# Patient Record
Sex: MALE | Race: WHITE | NOT HISPANIC OR LATINO | URBAN - METROPOLITAN AREA
[De-identification: names, ages, dates, MRNs, and addresses within clinical notes are randomized per-mention and may not be internally consistent; named-entity substitution may affect disease eponyms.]

---

## 2018-07-06 ENCOUNTER — OFFICE VISIT (OUTPATIENT)
Dept: INTERNAL MEDICINE | Facility: CLINIC | Age: 28
End: 2018-07-06
Payer: COMMERCIAL

## 2018-07-06 VITALS
TEMPERATURE: 97.8 F | RESPIRATION RATE: 18 BRPM | DIASTOLIC BLOOD PRESSURE: 78 MMHG | SYSTOLIC BLOOD PRESSURE: 110 MMHG | HEART RATE: 79 BPM | BODY MASS INDEX: 25.49 KG/M2 | OXYGEN SATURATION: 98 % | WEIGHT: 168.2 LBS | HEIGHT: 68 IN

## 2018-07-06 DIAGNOSIS — Z00.00 ENCOUNTER FOR ANNUAL PHYSICAL EXAM: Primary | ICD-10-CM

## 2018-07-06 PROCEDURE — 99385 PREV VISIT NEW AGE 18-39: CPT | Performed by: INTERNAL MEDICINE

## 2018-07-06 ASSESSMENT — ENCOUNTER SYMPTOMS
SHORTNESS OF BREATH: 0
DIARRHEA: 0
NERVOUS/ANXIOUS: 0
DIFFICULTY URINATING: 0
SORE THROAT: 0
PALPITATIONS: 0
SEIZURES: 0
ABDOMINAL PAIN: 0
FREQUENCY: 0
WEAKNESS: 0
CONSTIPATION: 0
BACK PAIN: 1
COUGH: 0
DIZZINESS: 0
SINUS PRESSURE: 0
BRUISES/BLEEDS EASILY: 0
DYSPHORIC MOOD: 0
SLEEP DISTURBANCE: 1
NECK PAIN: 0
ARTHRALGIAS: 0
JOINT SWELLING: 0
NUMBNESS: 0
UNEXPECTED WEIGHT CHANGE: 0
FATIGUE: 0
FEVER: 0
HEADACHES: 0

## 2018-07-06 NOTE — ASSESSMENT & PLAN NOTE
Doing well.  Advised to get the flu shot in the fall.  Labs not indicated.  Declines anyway.  Advised to check records for Tdap status.

## 2018-07-06 NOTE — PROGRESS NOTES
Subjective      Patient ID: Al Luu is a 27 y.o. male.    New patient.  Presents for a physical. Feels well.  No complaints.        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 No weight    Diet- regular    Pets- Cat    Islam- Sikhism    Seat belt- yes    Smoke alarm- yes    Firearms- None        Grew up in [place] [place].  School in [employer] [place] [facility] [facility], worked in [place] for a couple years and moved back here 4 yrs ago.               Review of Systems   Constitutional: Negative for fatigue, fever and unexpected weight change.   HENT: Negative for congestion, hearing loss, sinus pressure and sore throat.    Eyes: Negative for visual disturbance.   Respiratory: Negative for cough and shortness of breath.    Cardiovascular: Negative for chest pain, palpitations and leg swelling.   Gastrointestinal: Negative for abdominal pain, constipation and diarrhea.   Endocrine: Negative for cold intolerance and heat intolerance.   Genitourinary: Negative for difficulty  "urinating and frequency.   Musculoskeletal: Positive for back pain. Negative for arthralgias, joint swelling and neck pain.   Skin: Negative for rash.   Allergic/Immunologic: Negative for environmental allergies and food allergies.   Neurological: Negative for dizziness, seizures, weakness, numbness and headaches.   Hematological: Does not bruise/bleed easily.   Psychiatric/Behavioral: Positive for sleep disturbance (can't fall asleep). Negative for dysphoric mood. The patient is not nervous/anxious.        No Known Allergies  No current outpatient prescriptions on file.     No current facility-administered medications for this visit.        Objective   Vitals:    07/06/18 1045   BP: 110/78   BP Location: Left upper arm   Patient Position: Sitting   Pulse: 79   Resp: 18   Temp: 36.6 °C (97.8 °F)   SpO2: 98%   Weight: 76.3 kg (168 lb 3.2 oz)   Height: 1.715 m (5' 7.5\")       Physical Exam   Constitutional: He is oriented to person, place, and time. He appears well-developed and well-nourished. He is cooperative.   HENT:   Head: Normocephalic and atraumatic.   Right Ear: Tympanic membrane, external ear and ear canal normal.   Left Ear: Tympanic membrane, external ear and ear canal normal.   Nose: Nose normal.   Mouth/Throat: Uvula is midline, oropharynx is clear and moist and mucous membranes are normal.   Eyes: Conjunctivae, EOM and lids are normal. Pupils are equal, round, and reactive to light.   Neck: Trachea normal, normal range of motion and phonation normal. Neck supple. Carotid bruit is not present. No thyroid mass and no thyromegaly present.   Cardiovascular: Normal rate, regular rhythm, S1 normal, S2 normal, normal heart sounds and intact distal pulses.    Pulmonary/Chest: Effort normal and breath sounds normal. He has no decreased breath sounds. He has no wheezes. He has no rhonchi. He has no rales.   Abdominal: Soft. Normal appearance and bowel sounds are normal. There is no tenderness. There is no rebound " and no guarding.   Musculoskeletal: Normal range of motion.   Neurological: He is alert and oriented to person, place, and time. He has normal strength. No cranial nerve deficit or sensory deficit. Gait normal.   Skin: Skin is warm, dry and intact. No rash noted.   Psychiatric: He has a normal mood and affect. His speech is normal and behavior is normal. Judgment and thought content normal. Cognition and memory are normal.       Assessment/Plan   Problem List Items Addressed This Visit     Encounter for annual physical exam - Primary     Doing well.  Advised to get the flu shot in the fall.  Labs not indicated.  Declines anyway.  Advised to check records for Tdap status.                Jess Edgar MD    7/6/2018

## 2019-11-18 ENCOUNTER — TELEPHONE (OUTPATIENT)
Dept: INTERNAL MEDICINE | Facility: CLINIC | Age: 29
End: 2019-11-18

## 2019-11-18 NOTE — TELEPHONE ENCOUNTER
Pts wife Julia called to make an appointment for her  who reports the following symptoms: vomiting approximately one hour after he eats but this does not happen every day only notice this a few times in past week also has some back pain and feels nauseas. Offered to get him scheduled with the NP but the times offered conflict with his schedule. 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 Wife asked me to send a message to his doctor.

## 2019-11-25 ENCOUNTER — OFFICE VISIT (OUTPATIENT)
Dept: INTERNAL MEDICINE | Facility: CLINIC | Age: 29
End: 2019-11-25
Payer: COMMERCIAL

## 2019-11-25 VITALS
HEIGHT: 67 IN | DIASTOLIC BLOOD PRESSURE: 82 MMHG | TEMPERATURE: 97.3 F | SYSTOLIC BLOOD PRESSURE: 122 MMHG | OXYGEN SATURATION: 99 % | BODY MASS INDEX: 25.43 KG/M2 | WEIGHT: 162 LBS | RESPIRATION RATE: 14 BRPM | HEART RATE: 78 BPM

## 2019-11-25 DIAGNOSIS — R11.0 NAUSEA: ICD-10-CM

## 2019-11-25 DIAGNOSIS — R10.84 GENERALIZED ABDOMINAL PAIN: Primary | ICD-10-CM

## 2019-11-25 PROCEDURE — 99213 OFFICE O/P EST LOW 20 MIN: CPT | Performed by: NURSE PRACTITIONER

## 2019-11-25 ASSESSMENT — ENCOUNTER SYMPTOMS
HEMATURIA: 0
CHEST TIGHTNESS: 0
HEMATOCHEZIA: 0
ADENOPATHY: 0
UNEXPECTED WEIGHT CHANGE: 0
BELCHING: 0
MYALGIAS: 0
ABDOMINAL PAIN: 1
LIGHT-HEADEDNESS: 0
PALPITATIONS: 0
COUGH: 0
WEIGHT LOSS: 0
FEVER: 0
ANOREXIA: 0
EYE REDNESS: 0
FREQUENCY: 0
VOMITING: 0
SORE THROAT: 0
ANAL BLEEDING: 0
DIZZINESS: 0
FATIGUE: 0
DIARRHEA: 1
ARTHRALGIAS: 0
NAUSEA: 1
DYSURIA: 0
HEADACHES: 0
CHILLS: 0
ABDOMINAL DISTENTION: 0
RECTAL PAIN: 0
BLOOD IN STOOL: 0
CONSTIPATION: 0
FLATUS: 0
WHEEZING: 0

## 2019-11-25 NOTE — PROGRESS NOTES
Subjective      Patient ID: Al Luu is a 28 y.o. male.  1990      28 year old male with no significant past medical history  presents to the office with complaint of generalized abdominal discomfort along with nausea.  He said these symptoms have been occurring for a couple of months but were worse last week.  As of this week, he is feeling better.  He says the discomfort is generalized.  It does not localize at all.  He does feel nauseous but does not vomit.  He says pepto bismal and TUMS help his symptoms.  He denies fever, chills.  He denies associated foods, drinks or medications.  He will have diarrhea maybe twice a week but not all the time.  He denies black or bloody stools.  What made him come today was that he was having lower back pain with the abdominal pain last week and was concerned for an ulcer.  However, his symptoms have resolved and he is feeling better. He also reports he was sleeping on the cough while feeling ill which he thinks caused his back pain.  Denies presently.   Denies increased use of NSAIDS, denies recent travel, denies bad food, denies new medications.    Abdominal Pain   The current episode started more than 1 month ago. The problem occurs intermittently. The problem has been gradually improving. The pain is located in the generalized abdominal region. The abdominal pain radiates to the back. Associated symptoms include diarrhea and nausea. Pertinent negatives include no anorexia, arthralgias, belching, constipation, dysuria, fever, flatus, frequency, headaches, hematochezia, hematuria, melena, myalgias, vomiting or weight loss. Relieved by: pepto and  tums.       The following have been reviewed and updated as appropriate in this visit:  Tobacco  Fam Hx       Review of Systems   Constitutional: Negative for chills, fatigue, fever, unexpected weight change and weight loss.   HENT: Negative for sore throat.    Eyes: Negative for redness.   Respiratory: Negative for  "cough, chest tightness and wheezing.    Cardiovascular: Negative for chest pain and palpitations.   Gastrointestinal: Positive for abdominal pain, diarrhea and nausea. Negative for abdominal distention, anal bleeding, anorexia, blood in stool, constipation, flatus, hematochezia, melena, rectal pain and vomiting.   Genitourinary: Negative for dysuria, frequency, hematuria and urgency.   Musculoskeletal: Negative for arthralgias and myalgias.   Skin: Negative for rash.   Allergic/Immunologic: Negative for food allergies.   Neurological: Negative for dizziness, light-headedness and headaches.   Hematological: Negative for adenopathy.       Objective     Vitals:    11/25/19 1632   BP: 122/82   BP Location: Left upper arm   Patient Position: Sitting   Pulse: 78   Resp: 14   Temp: 36.3 °C (97.3 °F)   TempSrc: Oral   SpO2: 99%   Weight: 73.5 kg (162 lb)   Height: 1.702 m (5' 7\")     Body mass index is 25.37 kg/m².    Physical Exam   Constitutional: He is oriented to person, place, and time. He appears well-developed and well-nourished. No distress.   HENT:   Head: Normocephalic and atraumatic.   Right Ear: External ear normal.   Eyes: Pupils are equal, round, and reactive to light. Conjunctivae and EOM are normal. No scleral icterus.   Neck: Normal range of motion. Neck supple. No tracheal deviation present.   Cardiovascular: Normal rate, regular rhythm and normal heart sounds.   No murmur heard.  Pulmonary/Chest: Effort normal and breath sounds normal. No respiratory distress.   Abdominal: Soft. Bowel sounds are normal. He exhibits no distension and no mass. There is no tenderness. There is no rebound and no guarding.   Lymphadenopathy:     He has no cervical adenopathy.   Neurological: He is alert and oriented to person, place, and time.   Skin: Skin is warm and dry. Capillary refill takes less than 2 seconds. No rash noted.   Psychiatric: He has a normal mood and affect. His behavior is normal. Judgment and thought " content normal.   Vitals reviewed.      Assessment/Plan   Diagnoses and all orders for this visit:    Generalized abdominal pain (Primary)  Assessment & Plan:  Resolved presently but if returns (has been an ongoing issue)  ? Issue with GERD  Pt advised to keep log of pain, triggers and associated symptoms  pepcid 20 mg daily as needed  Return in 4 weeks         Nausea  Assessment & Plan:  Resolved presently but if returns (has been an ongoing issue)  ? Issue with GERD  Pt advised to keep log of pain, triggers and associated symptoms  pepcid 20 mg daily as needed  Return in 4 weeks

## 2019-11-26 NOTE — ASSESSMENT & PLAN NOTE
Resolved presently but if returns (has been an ongoing issue)  ? Issue with GERD  Pt advised to keep log of pain, triggers and associated symptoms  pepcid 20 mg daily as needed  Return in 4 weeks

## 2020-09-29 ENCOUNTER — OFFICE VISIT (OUTPATIENT)
Dept: INTERNAL MEDICINE | Facility: CLINIC | Age: 30
End: 2020-09-29
Payer: COMMERCIAL

## 2020-09-29 VITALS
WEIGHT: 167 LBS | HEART RATE: 80 BPM | DIASTOLIC BLOOD PRESSURE: 68 MMHG | SYSTOLIC BLOOD PRESSURE: 112 MMHG | HEIGHT: 67 IN | BODY MASS INDEX: 26.21 KG/M2 | TEMPERATURE: 98.6 F | RESPIRATION RATE: 18 BRPM | OXYGEN SATURATION: 99 %

## 2020-09-29 DIAGNOSIS — R35.0 URINARY FREQUENCY: Primary | ICD-10-CM

## 2020-09-29 PROBLEM — R30.0 DYSURIA: Status: ACTIVE | Noted: 2020-09-29

## 2020-09-29 PROCEDURE — 90471 IMMUNIZATION ADMIN: CPT | Performed by: INTERNAL MEDICINE

## 2020-09-29 PROCEDURE — 99213 OFFICE O/P EST LOW 20 MIN: CPT | Mod: 25 | Performed by: INTERNAL MEDICINE

## 2020-09-29 PROCEDURE — 90686 IIV4 VACC NO PRSV 0.5 ML IM: CPT | Performed by: INTERNAL MEDICINE

## 2020-09-29 ASSESSMENT — ENCOUNTER SYMPTOMS
FREQUENCY: 1
DYSURIA: 0
ABDOMINAL PAIN: 0
FATIGUE: 0
HEMATURIA: 0
DIFFICULTY URINATING: 0
FEVER: 0
ARTHRALGIAS: 0
SHORTNESS OF BREATH: 0
FLANK PAIN: 0

## 2020-09-29 NOTE — PROGRESS NOTES
Subjective      Patient ID: Al Luu is a 29 y.o. male.    HPI    Patient presents with c/o urinary frequency. Started Thursday, 5 days ago.  Noticed he has the urge to urinate as often as every 10-15 minutes.  Tried OTC medications over the weekend which helps somewhat.  Drinks a lot of water.  No additional caffeine or alcohol than usual.  No pain or burning. Normal urine color.  No back/flank pain. No N/V. No constipation or diarrhea.  No fevers or chills.  No obvious trigger.  No penile rash or discharge.  No pain with BMs.  No other new/acute complaints.     The following have been reviewed and updated as appropriate in this visit:    Allergies  Meds  Problems         Past Medical History:   Diagnosis Date   • No known health problems      Past Surgical History:   Procedure Laterality Date   • NO PAST SURGERIES       Family History   Problem Relation Age of Onset   • No Known Problems Biological Mother    • No Known Problems Biological Father    • No Known Problems Biological Sister    • Other Biological Brother         UTIs and stone. 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[place] [place]   Social Needs   • Financial resource strain: None   • Food insecurity     Worry: None     Inability: None   • Transportation needs     Medical: None     Non-medical: None   Tobacco Use   • Smoking status: Never Smoker   • Smokeless tobacco: Never Used   Substance and Sexual Activity   • Alcohol use: Yes     Comment: social   • Drug use: No   • Sexual activity: None   Lifestyle   • Physical activity     Days per week: None     Minutes per session: None   • Stress: None   Relationships   • Social connections     Talks on phone: None     Gets together: None     Attends  "Sikh service: None     Active member of club or organization: None     Attends meetings of clubs or organizations: None     Relationship status: None   • Intimate partner violence     Fear of current or ex partner: None     Emotionally abused: None     Physically abused: None     Forced sexual activity: None   Other Topics Concern   • None   Social History Narrative    Marital status-     Children- none    Caffeine - coffee 1 daily    Exercise- cardio 1-2 times a week. No weight    Diet- regular    Pets- Cat    Restoration- Druze    Seat belt- yes    Smoke alarm- yes    Firearms- None        Grew up in [place] [facility].  School in [place] [facility] [facility], worked in [place] for a couple years and moved back here 4 yrs ago.           Review of Systems   Constitutional: Negative for fatigue and fever.   Respiratory: Negative for shortness of breath.    Cardiovascular: Negative for chest pain.   Gastrointestinal: Negative for abdominal pain.   Genitourinary: Positive for frequency. Negative for difficulty urinating, discharge, dysuria, flank pain, genital sores, hematuria, penile pain, penile swelling and urgency.        Feeling of incomplete emptying   Musculoskeletal: Negative for arthralgias.       No Known Allergies  No current outpatient medications on file.     No current facility-administered medications for this visit.        Objective   Vitals:    09/29/20 1606   BP: 112/68   Pulse: 80   Resp: 18   Temp: 37 °C (98.6 °F)   SpO2: 99%   Weight: 75.8 kg (167 lb)   Height: 1.702 m (5' 7\")     Body mass index is 26.16 kg/m².    Physical Exam  Vitals signs reviewed.   Constitutional:       Appearance: He is well-developed.   HENT:      Head: Normocephalic and atraumatic.   Cardiovascular:      Rate and Rhythm: Normal rate and regular rhythm.      Heart sounds: Normal heart sounds. No murmur.   Pulmonary:      Effort: Pulmonary effort is normal.      Breath sounds: Normal breath sounds. No decreased breath " sounds, wheezing, rhonchi or rales.   Abdominal:      General: Abdomen is flat. Bowel sounds are normal. There is no distension.      Palpations: Abdomen is soft.      Tenderness: There is abdominal tenderness (minimal suprapubic tenderness). There is no right CVA tenderness, left CVA tenderness, guarding or rebound.   Musculoskeletal: Normal range of motion.   Neurological:      Mental Status: He is alert and oriented to person, place, and time.      Cranial Nerves: No cranial nerve deficit.   Psychiatric:         Behavior: Behavior normal.         Thought Content: Thought content normal.         Judgment: Judgment normal.         Assessment/Plan   Problem List Items Addressed This Visit        Genitourinary    Urinary frequency - Primary     Unclear etiology.  Overall improved with Azo OTC.  Advised to continue to stay hydrated. Will check UA/UCx and treat if positive.  If neg and symptoms persistent, will refer to urology.          Relevant Orders    Urinalysis with microscopic    Urine culture          Jess Edgar MD    9/29/2020

## 2020-09-30 NOTE — ASSESSMENT & PLAN NOTE
Unclear etiology.  Overall improved with Azo OTC.  Advised to continue to stay hydrated. Will check UA/UCx and treat if positive.  If neg and symptoms persistent, will refer to urology.

## 2022-05-13 ENCOUNTER — HOSPITAL ENCOUNTER (OUTPATIENT)
Facility: HOSPITAL | Age: 32
Setting detail: OBSERVATION
Discharge: HOME | End: 2022-05-15
Attending: EMERGENCY MEDICINE | Admitting: SURGERY
Payer: COMMERCIAL

## 2022-05-13 DIAGNOSIS — K35.80 ACUTE APPENDICITIS, UNSPECIFIED ACUTE APPENDICITIS TYPE: Primary | ICD-10-CM

## 2022-05-13 LAB
ALBUMIN SERPL-MCNC: 4.1 G/DL (ref 3.4–5)
ALP SERPL-CCNC: 62 IU/L (ref 35–126)
ALT SERPL-CCNC: 26 IU/L (ref 16–63)
ANION GAP SERPL CALC-SCNC: 10 MEQ/L (ref 3–15)
AST SERPL-CCNC: 26 IU/L (ref 15–41)
BASOPHILS # BLD: 0.03 K/UL (ref 0.01–0.1)
BASOPHILS NFR BLD: 0.3 %
BILIRUB SERPL-MCNC: 0.6 MG/DL (ref 0.3–1.2)
BILIRUB UR QL STRIP.AUTO: NEGATIVE MG/DL
BUN SERPL-MCNC: 10 MG/DL (ref 8–20)
CALCIUM SERPL-MCNC: 9.2 MG/DL (ref 8.9–10.3)
CHLORIDE SERPL-SCNC: 103 MEQ/L (ref 98–109)
CLARITY UR REFRACT.AUTO: CLEAR
CO2 SERPL-SCNC: 25 MEQ/L (ref 22–32)
COLOR UR AUTO: COLORLESS
CREAT SERPL-MCNC: 1 MG/DL (ref 0.8–1.3)
DIFFERENTIAL METHOD BLD: ABNORMAL
EOSINOPHIL # BLD: 0.06 K/UL (ref 0.04–0.54)
EOSINOPHIL NFR BLD: 0.5 %
ERYTHROCYTE [DISTWIDTH] IN BLOOD BY AUTOMATED COUNT: 11.9 % (ref 11.6–14.4)
GFR SERPL CREATININE-BSD FRML MDRD: >60 ML/MIN/1.73M*2
GLUCOSE SERPL-MCNC: 106 MG/DL (ref 70–99)
GLUCOSE UR STRIP.AUTO-MCNC: NEGATIVE MG/DL
HCT VFR BLDCO AUTO: 43.4 % (ref 40.1–51)
HGB BLD-MCNC: 14.5 G/DL (ref 13.7–17.5)
HGB UR QL STRIP.AUTO: NEGATIVE
IMM GRANULOCYTES # BLD AUTO: 0.03 K/UL (ref 0–0.08)
IMM GRANULOCYTES NFR BLD AUTO: 0.3 %
KETONES UR STRIP.AUTO-MCNC: NEGATIVE MG/DL
LEUKOCYTE ESTERASE UR QL STRIP.AUTO: NEGATIVE
LIPASE SERPL-CCNC: 33 U/L (ref 20–51)
LYMPHOCYTES # BLD: 1.48 K/UL (ref 1.2–3.5)
LYMPHOCYTES NFR BLD: 13.3 %
MCH RBC QN AUTO: 28.9 PG (ref 28–33.2)
MCHC RBC AUTO-ENTMCNC: 33.4 G/DL (ref 32.2–36.5)
MCV RBC AUTO: 86.5 FL (ref 83–98)
MONOCYTES # BLD: 0.68 K/UL (ref 0.3–1)
MONOCYTES NFR BLD: 6.1 %
NEUTROPHILS # BLD: 8.84 K/UL (ref 1.7–7)
NEUTS SEG NFR BLD: 79.5 %
NITRITE UR QL STRIP.AUTO: NEGATIVE
NRBC BLD-RTO: 0 %
PDW BLD AUTO: 10.1 FL (ref 9.4–12.4)
PH UR STRIP.AUTO: 6.5 [PH]
PLATELET # BLD AUTO: 240 K/UL (ref 150–350)
POTASSIUM SERPL-SCNC: 3.7 MEQ/L (ref 3.6–5.1)
PROT SERPL-MCNC: 6.6 G/DL (ref 6–8.2)
PROT UR QL STRIP.AUTO: NEGATIVE
RBC # BLD AUTO: 5.02 M/UL (ref 4.5–5.8)
SODIUM SERPL-SCNC: 138 MEQ/L (ref 136–144)
SP GR UR REFRACT.AUTO: 1.01
UROBILINOGEN UR STRIP-ACNC: 0.2 EU/DL
WBC # BLD AUTO: 11.12 K/UL (ref 3.8–10.5)

## 2022-05-13 PROCEDURE — 3E0333Z INTRODUCTION OF ANTI-INFLAMMATORY INTO PERIPHERAL VEIN, PERCUTANEOUS APPROACH: ICD-10-PCS | Performed by: EMERGENCY MEDICINE

## 2022-05-13 PROCEDURE — 3E013GC INTRODUCTION OF OTHER THERAPEUTIC SUBSTANCE INTO SUBCUTANEOUS TISSUE, PERCUTANEOUS APPROACH: ICD-10-PCS | Performed by: EMERGENCY MEDICINE

## 2022-05-13 PROCEDURE — 83690 ASSAY OF LIPASE: CPT

## 2022-05-13 PROCEDURE — 82040 ASSAY OF SERUM ALBUMIN: CPT

## 2022-05-13 PROCEDURE — 85025 COMPLETE CBC W/AUTO DIFF WBC: CPT

## 2022-05-13 PROCEDURE — 83690 ASSAY OF LIPASE: CPT | Performed by: EMERGENCY MEDICINE

## 2022-05-13 PROCEDURE — 81003 URINALYSIS AUTO W/O SCOPE: CPT

## 2022-05-13 PROCEDURE — 85025 COMPLETE CBC W/AUTO DIFF WBC: CPT | Performed by: EMERGENCY MEDICINE

## 2022-05-13 PROCEDURE — 3E03329 INTRODUCTION OF OTHER ANTI-INFECTIVE INTO PERIPHERAL VEIN, PERCUTANEOUS APPROACH: ICD-10-PCS | Performed by: EMERGENCY MEDICINE

## 2022-05-13 PROCEDURE — 81003 URINALYSIS AUTO W/O SCOPE: CPT | Performed by: EMERGENCY MEDICINE

## 2022-05-13 PROCEDURE — 80053 COMPREHEN METABOLIC PANEL: CPT | Performed by: EMERGENCY MEDICINE

## 2022-05-13 PROCEDURE — 36415 COLL VENOUS BLD VENIPUNCTURE: CPT

## 2022-05-13 PROCEDURE — 99285 EMERGENCY DEPT VISIT HI MDM: CPT | Mod: 25

## 2022-05-14 ENCOUNTER — ANESTHESIA (INPATIENT)
Dept: OPERATING ROOM | Facility: HOSPITAL | Age: 32
End: 2022-05-14
Payer: COMMERCIAL

## 2022-05-14 ENCOUNTER — ANESTHESIA EVENT (INPATIENT)
Dept: OPERATING ROOM | Facility: HOSPITAL | Age: 32
End: 2022-05-14
Payer: COMMERCIAL

## 2022-05-14 ENCOUNTER — APPOINTMENT (EMERGENCY)
Dept: RADIOLOGY | Facility: HOSPITAL | Age: 32
End: 2022-05-14
Attending: EMERGENCY MEDICINE
Payer: COMMERCIAL

## 2022-05-14 PROBLEM — K35.80 ACUTE APPENDICITIS, UNSPECIFIED ACUTE APPENDICITIS TYPE: Status: ACTIVE | Noted: 2022-05-14

## 2022-05-14 LAB
ABO + RH BLD: NORMAL
ANION GAP SERPL CALC-SCNC: 9 MEQ/L (ref 3–15)
APTT PPP: 29 SEC (ref 23–35)
BLD GP AB SCN SERPL QL: NEGATIVE
BUN SERPL-MCNC: 9 MG/DL (ref 8–20)
CALCIUM SERPL-MCNC: 9.3 MG/DL (ref 8.9–10.3)
CHLORIDE SERPL-SCNC: 104 MEQ/L (ref 98–109)
CO2 SERPL-SCNC: 26 MEQ/L (ref 22–32)
CREAT SERPL-MCNC: 1 MG/DL (ref 0.8–1.3)
D AG BLD QL: POSITIVE
ERYTHROCYTE [DISTWIDTH] IN BLOOD BY AUTOMATED COUNT: 11.8 % (ref 11.6–14.4)
GFR SERPL CREATININE-BSD FRML MDRD: >60 ML/MIN/1.73M*2
GLUCOSE SERPL-MCNC: 113 MG/DL (ref 70–99)
HCT VFR BLDCO AUTO: 40.8 % (ref 40.1–51)
HGB BLD-MCNC: 13.8 G/DL (ref 13.7–17.5)
INR PPP: 1.2
LABORATORY COMMENT REPORT: NORMAL
MAGNESIUM SERPL-MCNC: 1.6 MG/DL (ref 1.8–2.5)
MCH RBC QN AUTO: 28.9 PG (ref 28–33.2)
MCHC RBC AUTO-ENTMCNC: 33.8 G/DL (ref 32.2–36.5)
MCV RBC AUTO: 85.4 FL (ref 83–98)
PDW BLD AUTO: 10 FL (ref 9.4–12.4)
PLATELET # BLD AUTO: 198 K/UL (ref 150–350)
POTASSIUM SERPL-SCNC: 3.3 MEQ/L (ref 3.6–5.1)
PROTHROMBIN TIME: 14.9 SEC (ref 12.2–14.5)
RBC # BLD AUTO: 4.78 M/UL (ref 4.5–5.8)
SARS-COV-2 RNA RESP QL NAA+PROBE: NEGATIVE
SODIUM SERPL-SCNC: 139 MEQ/L (ref 136–144)
SPECIMEN EXP DATE BLD: NORMAL
WBC # BLD AUTO: 9.24 K/UL (ref 3.8–10.5)

## 2022-05-14 PROCEDURE — 85027 COMPLETE CBC AUTOMATED: CPT | Performed by: SURGERY

## 2022-05-14 PROCEDURE — 96374 THER/PROPH/DIAG INJ IV PUSH: CPT | Mod: 59

## 2022-05-14 PROCEDURE — 63600000 HC DRUGS/DETAIL CODE: Performed by: STUDENT IN AN ORGANIZED HEALTH CARE EDUCATION/TRAINING PROGRAM

## 2022-05-14 PROCEDURE — 12000000 HC ROOM AND CARE MED/SURG

## 2022-05-14 PROCEDURE — 25800000 HC PHARMACY IV SOLUTIONS: Performed by: STUDENT IN AN ORGANIZED HEALTH CARE EDUCATION/TRAINING PROGRAM

## 2022-05-14 PROCEDURE — 74177 CT ABD & PELVIS W/CONTRAST: CPT | Mod: ME

## 2022-05-14 PROCEDURE — 0DTJ4ZZ RESECTION OF APPENDIX, PERCUTANEOUS ENDOSCOPIC APPROACH: ICD-10-PCS | Performed by: SURGERY

## 2022-05-14 PROCEDURE — 63600000 HC DRUGS/DETAIL CODE: Performed by: ANESTHESIOLOGY

## 2022-05-14 PROCEDURE — 63700000 HC SELF-ADMINISTRABLE DRUG: Performed by: STUDENT IN AN ORGANIZED HEALTH CARE EDUCATION/TRAINING PROGRAM

## 2022-05-14 PROCEDURE — 86901 BLOOD TYPING SEROLOGIC RH(D): CPT

## 2022-05-14 PROCEDURE — G1004 CDSM NDSC: HCPCS

## 2022-05-14 PROCEDURE — G0378 HOSPITAL OBSERVATION PER HR: HCPCS

## 2022-05-14 PROCEDURE — 63600000 HC DRUGS/DETAIL CODE: Performed by: SURGERY

## 2022-05-14 PROCEDURE — 83735 ASSAY OF MAGNESIUM: CPT | Performed by: SURGERY

## 2022-05-14 PROCEDURE — 36000014 HC OR LEVEL 4 EA ADDL MIN: Performed by: SURGERY

## 2022-05-14 PROCEDURE — 25000000 HC PHARMACY GENERAL: Performed by: NURSE ANESTHETIST, CERTIFIED REGISTERED

## 2022-05-14 PROCEDURE — 25000000 HC PHARMACY GENERAL: Performed by: SURGERY

## 2022-05-14 PROCEDURE — 25000000 HC PHARMACY GENERAL: Performed by: STUDENT IN AN ORGANIZED HEALTH CARE EDUCATION/TRAINING PROGRAM

## 2022-05-14 PROCEDURE — 85730 THROMBOPLASTIN TIME PARTIAL: CPT | Performed by: EMERGENCY MEDICINE

## 2022-05-14 PROCEDURE — 36415 COLL VENOUS BLD VENIPUNCTURE: CPT | Performed by: EMERGENCY MEDICINE

## 2022-05-14 PROCEDURE — 99220 PR INITIAL OBSERVATION CARE/DAY 70 MINUTES: CPT | Mod: 57 | Performed by: SURGERY

## 2022-05-14 PROCEDURE — 80048 BASIC METABOLIC PNL TOTAL CA: CPT | Performed by: SURGERY

## 2022-05-14 PROCEDURE — 36000004 HC OR LEVEL 4 INITIAL 30MIN: Performed by: SURGERY

## 2022-05-14 PROCEDURE — 44970 LAPAROSCOPY APPENDECTOMY: CPT | Performed by: SURGERY

## 2022-05-14 PROCEDURE — 71000001 HC PACU PHASE 1 INITIAL 30MIN: Performed by: SURGERY

## 2022-05-14 PROCEDURE — 25800000 HC PHARMACY IV SOLUTIONS: Performed by: NURSE ANESTHETIST, CERTIFIED REGISTERED

## 2022-05-14 PROCEDURE — 99024 POSTOP FOLLOW-UP VISIT: CPT | Performed by: SURGERY

## 2022-05-14 PROCEDURE — 63600105 HC IODINE BASED CONTRAST: Performed by: EMERGENCY MEDICINE

## 2022-05-14 PROCEDURE — U0002 COVID-19 LAB TEST NON-CDC: HCPCS | Performed by: EMERGENCY MEDICINE

## 2022-05-14 PROCEDURE — 88304 TISSUE EXAM BY PATHOLOGIST: CPT | Performed by: SURGERY

## 2022-05-14 PROCEDURE — 63600000 HC DRUGS/DETAIL CODE: Performed by: EMERGENCY MEDICINE

## 2022-05-14 PROCEDURE — 85610 PROTHROMBIN TIME: CPT | Performed by: EMERGENCY MEDICINE

## 2022-05-14 PROCEDURE — 27200000 HC STERILE SUPPLY: Performed by: SURGERY

## 2022-05-14 PROCEDURE — 63600000 HC DRUGS/DETAIL CODE: Performed by: NURSE ANESTHETIST, CERTIFIED REGISTERED

## 2022-05-14 PROCEDURE — 71000011 HC PACU PHASE 1 EA ADDL MIN: Performed by: SURGERY

## 2022-05-14 PROCEDURE — 37000001 HC ANESTHESIA GENERAL: Performed by: SURGERY

## 2022-05-14 RX ORDER — POTASSIUM CHLORIDE 750 MG/1
40 TABLET, EXTENDED RELEASE ORAL ONCE
Status: COMPLETED | OUTPATIENT
Start: 2022-05-14 | End: 2022-05-14

## 2022-05-14 RX ORDER — PANTOPRAZOLE SODIUM 40 MG/10ML
40 INJECTION, POWDER, LYOPHILIZED, FOR SOLUTION INTRAVENOUS EVERY 24 HOURS
Status: DISCONTINUED | OUTPATIENT
Start: 2022-05-14 | End: 2022-05-15 | Stop reason: HOSPADM

## 2022-05-14 RX ORDER — DEXTROSE 40 %
15-30 GEL (GRAM) ORAL AS NEEDED
Status: DISCONTINUED | OUTPATIENT
Start: 2022-05-14 | End: 2022-05-14 | Stop reason: HOSPADM

## 2022-05-14 RX ORDER — HYDROMORPHONE HYDROCHLORIDE 1 MG/ML
0.5 INJECTION, SOLUTION INTRAMUSCULAR; INTRAVENOUS; SUBCUTANEOUS
Status: DISCONTINUED | OUTPATIENT
Start: 2022-05-14 | End: 2022-05-14 | Stop reason: HOSPADM

## 2022-05-14 RX ORDER — DEXAMETHASONE SODIUM PHOSPHATE 4 MG/ML
INJECTION, SOLUTION INTRA-ARTICULAR; INTRALESIONAL; INTRAMUSCULAR; INTRAVENOUS; SOFT TISSUE AS NEEDED
Status: DISCONTINUED | OUTPATIENT
Start: 2022-05-14 | End: 2022-05-14 | Stop reason: SURG

## 2022-05-14 RX ORDER — ENOXAPARIN SODIUM 100 MG/ML
40 INJECTION SUBCUTANEOUS
Status: DISCONTINUED | OUTPATIENT
Start: 2022-05-15 | End: 2022-05-14

## 2022-05-14 RX ORDER — OXYCODONE HYDROCHLORIDE 5 MG/1
5 TABLET ORAL EVERY 4 HOURS PRN
Status: DISCONTINUED | OUTPATIENT
Start: 2022-05-14 | End: 2022-05-15 | Stop reason: HOSPADM

## 2022-05-14 RX ORDER — METRONIDAZOLE 500 MG/100ML
500 INJECTION, SOLUTION INTRAVENOUS
Status: DISCONTINUED | OUTPATIENT
Start: 2022-05-14 | End: 2022-05-14

## 2022-05-14 RX ORDER — ACETAMINOPHEN 325 MG/1
975 TABLET ORAL EVERY 6 HOURS
Status: DISCONTINUED | OUTPATIENT
Start: 2022-05-14 | End: 2022-05-15 | Stop reason: HOSPADM

## 2022-05-14 RX ORDER — DEXTROSE 50 % IN WATER (D50W) INTRAVENOUS SYRINGE
25 AS NEEDED
Status: DISCONTINUED | OUTPATIENT
Start: 2022-05-14 | End: 2022-05-15 | Stop reason: HOSPADM

## 2022-05-14 RX ORDER — CEFAZOLIN SODIUM 2 G/100ML
2 INJECTION, SOLUTION INTRAVENOUS
Status: DISCONTINUED | OUTPATIENT
Start: 2022-05-14 | End: 2022-05-14

## 2022-05-14 RX ORDER — PROPOFOL 10 MG/ML
INJECTION, EMULSION INTRAVENOUS AS NEEDED
Status: DISCONTINUED | OUTPATIENT
Start: 2022-05-14 | End: 2022-05-14 | Stop reason: SURG

## 2022-05-14 RX ORDER — ENOXAPARIN SODIUM 100 MG/ML
40 INJECTION SUBCUTANEOUS
Status: DISCONTINUED | OUTPATIENT
Start: 2022-05-15 | End: 2022-05-15 | Stop reason: HOSPADM

## 2022-05-14 RX ORDER — DEXTROSE, SODIUM CHLORIDE, SODIUM LACTATE, POTASSIUM CHLORIDE, AND CALCIUM CHLORIDE 5; .6; .31; .03; .02 G/100ML; G/100ML; G/100ML; G/100ML; G/100ML
INJECTION, SOLUTION INTRAVENOUS CONTINUOUS
Status: DISCONTINUED | OUTPATIENT
Start: 2022-05-14 | End: 2022-05-14

## 2022-05-14 RX ORDER — POTASSIUM CHLORIDE 14.9 MG/ML
20 INJECTION INTRAVENOUS ONCE
Status: COMPLETED | OUTPATIENT
Start: 2022-05-14 | End: 2022-05-14

## 2022-05-14 RX ORDER — POTASSIUM CHLORIDE 14.9 MG/ML
20 INJECTION INTRAVENOUS ONCE
Status: DISCONTINUED | OUTPATIENT
Start: 2022-05-14 | End: 2022-05-14

## 2022-05-14 RX ORDER — ONDANSETRON HYDROCHLORIDE 2 MG/ML
INJECTION, SOLUTION INTRAVENOUS AS NEEDED
Status: DISCONTINUED | OUTPATIENT
Start: 2022-05-14 | End: 2022-05-14 | Stop reason: SURG

## 2022-05-14 RX ORDER — OXYCODONE HYDROCHLORIDE 5 MG/1
5 TABLET ORAL EVERY 6 HOURS PRN
Qty: 15 TABLET | Refills: 0 | Status: SHIPPED | OUTPATIENT
Start: 2022-05-14 | End: 2022-05-19

## 2022-05-14 RX ORDER — ROCURONIUM BROMIDE 10 MG/ML
INJECTION, SOLUTION INTRAVENOUS AS NEEDED
Status: DISCONTINUED | OUTPATIENT
Start: 2022-05-14 | End: 2022-05-14 | Stop reason: SURG

## 2022-05-14 RX ORDER — HYDROMORPHONE HYDROCHLORIDE 1 MG/ML
0.5 INJECTION, SOLUTION INTRAMUSCULAR; INTRAVENOUS; SUBCUTANEOUS
Status: DISCONTINUED | OUTPATIENT
Start: 2022-05-14 | End: 2022-05-14

## 2022-05-14 RX ORDER — DEXTROSE 40 %
15-30 GEL (GRAM) ORAL AS NEEDED
Status: DISCONTINUED | OUTPATIENT
Start: 2022-05-14 | End: 2022-05-15 | Stop reason: HOSPADM

## 2022-05-14 RX ORDER — POTASSIUM CHLORIDE 750 MG/1
40 TABLET, EXTENDED RELEASE ORAL ONCE
Status: DISPENSED | OUTPATIENT
Start: 2022-05-14 | End: 2022-05-14

## 2022-05-14 RX ORDER — KETOROLAC TROMETHAMINE 30 MG/ML
30 INJECTION, SOLUTION INTRAMUSCULAR; INTRAVENOUS ONCE
Status: COMPLETED | OUTPATIENT
Start: 2022-05-14 | End: 2022-05-14

## 2022-05-14 RX ORDER — BUPIVACAINE HYDROCHLORIDE 5 MG/ML
INJECTION, SOLUTION EPIDURAL; INTRACAUDAL
Status: DISCONTINUED | OUTPATIENT
Start: 2022-05-14 | End: 2022-05-14 | Stop reason: HOSPADM

## 2022-05-14 RX ORDER — FENTANYL CITRATE 50 UG/ML
INJECTION, SOLUTION INTRAMUSCULAR; INTRAVENOUS AS NEEDED
Status: DISCONTINUED | OUTPATIENT
Start: 2022-05-14 | End: 2022-05-14 | Stop reason: SURG

## 2022-05-14 RX ORDER — ONDANSETRON HYDROCHLORIDE 2 MG/ML
4 INJECTION, SOLUTION INTRAVENOUS EVERY 8 HOURS PRN
Status: DISCONTINUED | OUTPATIENT
Start: 2022-05-14 | End: 2022-05-15 | Stop reason: HOSPADM

## 2022-05-14 RX ORDER — FENTANYL CITRATE 50 UG/ML
50 INJECTION, SOLUTION INTRAMUSCULAR; INTRAVENOUS
Status: DISCONTINUED | OUTPATIENT
Start: 2022-05-14 | End: 2022-05-14 | Stop reason: HOSPADM

## 2022-05-14 RX ORDER — ONDANSETRON HYDROCHLORIDE 2 MG/ML
4 INJECTION, SOLUTION INTRAVENOUS
Status: DISCONTINUED | OUTPATIENT
Start: 2022-05-14 | End: 2022-05-14 | Stop reason: HOSPADM

## 2022-05-14 RX ORDER — IBUPROFEN 200 MG
16-32 TABLET ORAL AS NEEDED
Status: DISCONTINUED | OUTPATIENT
Start: 2022-05-14 | End: 2022-05-15 | Stop reason: HOSPADM

## 2022-05-14 RX ORDER — IBUPROFEN 200 MG
16-32 TABLET ORAL AS NEEDED
Status: DISCONTINUED | OUTPATIENT
Start: 2022-05-14 | End: 2022-05-14 | Stop reason: HOSPADM

## 2022-05-14 RX ORDER — MIDAZOLAM HYDROCHLORIDE 2 MG/2ML
INJECTION, SOLUTION INTRAMUSCULAR; INTRAVENOUS AS NEEDED
Status: DISCONTINUED | OUTPATIENT
Start: 2022-05-14 | End: 2022-05-14 | Stop reason: SURG

## 2022-05-14 RX ORDER — LIDOCAINE HYDROCHLORIDE 10 MG/ML
INJECTION, SOLUTION INFILTRATION; PERINEURAL AS NEEDED
Status: DISCONTINUED | OUTPATIENT
Start: 2022-05-14 | End: 2022-05-14 | Stop reason: SURG

## 2022-05-14 RX ORDER — IODIXANOL 320 MG/ML
100 INJECTION, SOLUTION INTRAVASCULAR ONCE
Status: COMPLETED | OUTPATIENT
Start: 2022-05-14 | End: 2022-05-14

## 2022-05-14 RX ORDER — DEXTROSE 50 % IN WATER (D50W) INTRAVENOUS SYRINGE
25 AS NEEDED
Status: DISCONTINUED | OUTPATIENT
Start: 2022-05-14 | End: 2022-05-14 | Stop reason: HOSPADM

## 2022-05-14 RX ADMIN — KETOROLAC TROMETHAMINE 30 MG: 30 INJECTION, SOLUTION INTRAMUSCULAR; INTRAVENOUS at 01:31

## 2022-05-14 RX ADMIN — IODIXANOL 100 ML: 320 INJECTION, SOLUTION INTRAVASCULAR at 02:00

## 2022-05-14 RX ADMIN — SODIUM CHLORIDE: 9 INJECTION, SOLUTION INTRAVENOUS at 17:20

## 2022-05-14 RX ADMIN — CEFAZOLIN SODIUM 2 G: 2 INJECTION, SOLUTION INTRAVENOUS at 12:03

## 2022-05-14 RX ADMIN — METRONIDAZOLE 500 MG: 500 INJECTION, SOLUTION INTRAVENOUS at 12:14

## 2022-05-14 RX ADMIN — FENTANYL CITRATE 100 MCG: 50 INJECTION, SOLUTION INTRAMUSCULAR; INTRAVENOUS at 16:33

## 2022-05-14 RX ADMIN — LIDOCAINE HYDROCHLORIDE 5 ML: 10 INJECTION, SOLUTION INFILTRATION; PERINEURAL at 16:33

## 2022-05-14 RX ADMIN — HYDROMORPHONE HYDROCHLORIDE 0.5 MG: 1 INJECTION, SOLUTION INTRAMUSCULAR; INTRAVENOUS; SUBCUTANEOUS at 16:54

## 2022-05-14 RX ADMIN — ONDANSETRON HYDROCHLORIDE 4 MG: 2 SOLUTION INTRAMUSCULAR; INTRAVENOUS at 16:33

## 2022-05-14 RX ADMIN — CEFAZOLIN SODIUM 2 G: 2 INJECTION, SOLUTION INTRAVENOUS at 03:45

## 2022-05-14 RX ADMIN — ONDANSETRON 4 MG: 2 INJECTION INTRAMUSCULAR; INTRAVENOUS at 17:41

## 2022-05-14 RX ADMIN — MIDAZOLAM HYDROCHLORIDE 2 MG: 1 INJECTION, SOLUTION INTRAMUSCULAR; INTRAVENOUS at 16:33

## 2022-05-14 RX ADMIN — ACETAMINOPHEN 975 MG: 325 TABLET ORAL at 19:37

## 2022-05-14 RX ADMIN — METRONIDAZOLE 500 MG: 500 INJECTION, SOLUTION INTRAVENOUS at 04:20

## 2022-05-14 RX ADMIN — ONDANSETRON 8 MG: 2 INJECTION INTRAMUSCULAR; INTRAVENOUS at 05:17

## 2022-05-14 RX ADMIN — ROCURONIUM BROMIDE 50 MG: 10 INJECTION, SOLUTION INTRAVENOUS at 16:33

## 2022-05-14 RX ADMIN — SUGAMMADEX 200 MG: 100 INJECTION, SOLUTION INTRAVENOUS at 17:20

## 2022-05-14 RX ADMIN — SODIUM CHLORIDE, SODIUM LACTATE, POTASSIUM CHLORIDE, CALCIUM CHLORIDE AND DEXTROSE MONOHYDRATE: 5; 600; 310; 30; 20 INJECTION, SOLUTION INTRAVENOUS at 12:08

## 2022-05-14 RX ADMIN — PANTOPRAZOLE SODIUM 40 MG: 40 INJECTION, POWDER, FOR SOLUTION INTRAVENOUS at 09:03

## 2022-05-14 RX ADMIN — POTASSIUM CHLORIDE 40 MEQ: 750 TABLET, EXTENDED RELEASE ORAL at 12:04

## 2022-05-14 RX ADMIN — PROPOFOL INJECTABLE EMULSION 200 MG: 10 INJECTION, EMULSION INTRAVENOUS at 16:33

## 2022-05-14 RX ADMIN — HYDROMORPHONE HYDROCHLORIDE 0.5 MG: 1 INJECTION, SOLUTION INTRAMUSCULAR; INTRAVENOUS; SUBCUTANEOUS at 16:33

## 2022-05-14 RX ADMIN — SODIUM CHLORIDE, SODIUM LACTATE, POTASSIUM CHLORIDE, CALCIUM CHLORIDE AND DEXTROSE MONOHYDRATE: 5; 600; 310; 30; 20 INJECTION, SOLUTION INTRAVENOUS at 03:42

## 2022-05-14 RX ADMIN — POTASSIUM CHLORIDE 20 MEQ: 14.9 INJECTION, SOLUTION INTRAVENOUS at 09:02

## 2022-05-14 RX ADMIN — SODIUM CHLORIDE: 9 INJECTION, SOLUTION INTRAVENOUS at 16:27

## 2022-05-14 RX ADMIN — OXYCODONE HYDROCHLORIDE 5 MG: 5 TABLET ORAL at 19:37

## 2022-05-14 RX ADMIN — DEXAMETHASONE SODIUM PHOSPHATE 4 MG: 4 INJECTION, SOLUTION INTRAMUSCULAR; INTRAVENOUS at 16:33

## 2022-05-14 RX ADMIN — MAGNESIUM SULFATE HEPTAHYDRATE 4 G: 40 INJECTION, SOLUTION INTRAVENOUS at 12:07

## 2022-05-14 ASSESSMENT — PATIENT HEALTH QUESTIONNAIRE - PHQ9: SUM OF ALL RESPONSES TO PHQ9 QUESTIONS 1 & 2: 0

## 2022-05-14 NOTE — PERIOPERATIVE NURSING NOTE
Report faxed to 2SW and RN spoke to Carissa-aware that pt will be returning to floor. Encouraged that if RN has questions to call PACU

## 2022-05-14 NOTE — H&P
General Surgery History and Physical    Diagnosis: Acute appendicitis, unspecified acute appendicitis type [K35.80].    HPI     Patient is a 31 y.o. male with no significant medical history presents to the ED with abdominal pain since Thursday night. It began diffusely and has now localized more to the right lower quadrant. It has been associated with decreased p.o. intake throughout the day as well as increased pain when he dideat some lunch today. He reports a low-grade fever at home to 99.9. He otherwise has no complaints. No chills nausea or vomiting. He is having bowel movements and passing gas. On arrival to ER he is afebrile and hemodynamically stable with transient tachycardia to 108. He has a leukocytosis to 11.12 with otherwise normal labwork. He underwent CT of the abdomen and pelvis that shows acute appendicitis with thickened and dilated appendix to 1.4 cm with adjacent fatty stranding. On exam he is soft, mildly tender in the right lower quadrant with referral of pain from the left to the right lower quadrant, without rebound or guarding. He has never had any abdominal surgeries and takes no medications.    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   Tobacco Use   • Smoking status: Never Smoker   • Smokeless tobacco: Never Used   Substance and Sexual Activity   • Alcohol use: Yes     Comment: social   • Drug use: No   • Sexual activity: Yes   Social History Narrative    Marital status-     Children- none    Caffeine - coffee 1 daily     Exercise- cardio 1-2 times a week. No weight    Diet- regular    Pets- Cat    Mu-ism- Roman Catholic    Seat belt- yes    Smoke alarm- yes    Firearms- None        Grew up in [facility] [place].  School in [place] [place], worked in [place] for a couple years and moved back here 4 yrs ago.         Social Determinants of Health     Food Insecurity: No Food Insecurity   • Worried About Running Out of Food in the Last Year: Never true   • Ran Out of Food in the Last Year: Never true       Family History:   Family History   Problem Relation Age of Onset   • No Known Problems Biological Mother    • No Known Problems Biological Father    • No Known Problems Biological Sister    • Other Biological Brother         UTIs and stone. 1 of 2       Allergies: Patient has no known allergies.    Home Medications:  Not in a hospital admission.    Current Medications:  •  ceFAZolin, 2 g, intravenous, q8h INT  •  glucose, 16-32 g of dextrose, oral, PRN **OR** dextrose, 15-30 g of dextrose, oral, PRN **OR** glucagon, 1 mg, intramuscular, PRN **OR** dextrose in water, 25 mL, intravenous, PRN  •  dextrose 5 % and lactated Ringer's, , intravenous, Continuous  •  [Provider Managed Hold] enoxaparin, 40 mg, subcutaneous, Daily (6a)  •  HYDROmorphone, 0.5 mg, intravenous, q3h PRN  •  metroNIDAZOLE, 500 mg, intravenous, q8h INT  •  pantoprazole, 40 mg, intravenous, q24h    Review of Systems  Pertinent items are noted in HPI.    Objective     Vital Signs for the last 24 hours:  Temp:  [36.5 °C (97.7 °F)-37.2 °C (98.9 °F)] 37.2 °C (98.9 °F)  Heart Rate:  [] 89  Resp:  [16-20] 20  BP: (119-159)/(58-71) 127/59    Physicial Exam    General: awake and alert, well appearing, no acute distress  HEENT: normocephalic, atraumatic, EOM intact, conjunctiva clear, sclera nonicteric  CV: normal rate, normotensive  Pulm: normal work of breathing, no acute distress  Abd:  soft, mildly tender in the right lower quadrant with referral of pain from the left  to the right lower quadrant, without rebound or guarding  Skin: no abdominal incisions, warm and dry  Extr: no obvious abnormality, moving extremities spontaneously  Neuro: Grossly normal    Labs    CBC Results       05/13/22     2228    WBC 11.12    RBC 5.02    HGB 14.5    HCT 43.4    MCV 86.5    MCH 28.9    MCHC 33.4            BMP Results       05/13/22     2228        K 3.7    Cl 103    CO2 25    Glucose 106    BUN 10    Creatinine 1.0    Calcium 9.2    Anion Gap 10    EGFR >60.0         Comment for K at 2228 on 05/13/22: Results obtained on plasma. Plasma Potassium values may be up to 0.4 mEQ/L less than serum values. The differences may be greater for patients with high platelet or white cell counts.  SLIGHT HEMOLYSIS, RESULT MAY BE INCREASED.        Lipase 33    Imaging    CTAP (prelim)    Preliminary Impression:     Positive for acute appendicitis. The appendix is abnormally thickened, 1.4 cm, image 86, with mild adjacent stranding.     Grossly clear bases. No hydronephrosis. Relatively early renal delays. Distended urinary bladder.Significant amount of gas and stool within the colon, can reflect a component of constipation and ileus.The visualized solid abdominal organs otherwise show no acute pathology.   No bowel obstruction or free air.     Assessment/Plan   Patient is a 31 y.o. male with no significant medical history presents to the ED with abdominal pain and leukocytosis found to have acute appendicitis.    Admit to the surgical service    NPO  IVF  Ancef/Flagyl  COVID/Coags/T&S  Plan for laparoscopic appendectomy in the AM   Pain meds as needed    Discussed with Dr. Fall    Please page [phone] with questions or concerns.   Kecia Uriostegui MD  General Surgery PGY2

## 2022-05-14 NOTE — ANESTHESIOLOGIST PRE-PROCEDURE ATTESTATION
Pre-Procedure Patient Identification:  I am the Primary Anesthesiologist and have identified the patient on 05/14/22 at 1:16 PM.   I have confirmed the procedure(s) will be performed by the following surgeon/proceduralist Bird Fall MD.

## 2022-05-14 NOTE — DISCHARGE INSTRUCTIONS
Dr. Fall’s Post-Operative Instructions  Laparoscopic appendectomy    Remove any Band-Aids the day following your surgery. Leave any Steri-strips (small strips of tape) or surgical glue in place on the incisions, and they will peel off by themselves.    You may shower starting the day after surgery.      Resume your normal diet as tolerated.    Bruising and mild swelling at the incisions are normal. There may be a small, firm lump under each incision. Men may also experience bruising and swelling in the scrotum and penis. Women may experience some bruising and swelling in the vulva. This is all normal and will disappear within a few weeks.    You are likely to have some constipation for the first few days after surgery, due to the anesthesia and pain medication. Straining to move your bowels can be painful at the surgical site.  To make it easier, you may drink 4-6 oz of prune juice each morning. If there is still no bowel movement after the first 4 days, take a laxative such as Dulcolax or Milk of Magnesia in the morning. If there is still no bowel movement by the following morning after breakfast, please call the office.    You may resume light activity that is comfortable, including walking, climbing stairs, and going outdoors.  Don’t do anything more strenuous until you return for your follow-up visit. Sexual activity may be resumed cautiously whenever comfortable. You may ride in a car but do not drive until you are completely comfortable doing so and no longer taking narcotic pain medication.    Take Tylenol every 6 hours for pain control. This will provide low-level pain relief. Take the prescribed narcotic for breakthrough pain that is not controlled with Tylenol alone.      You may resume your usual medications, including aspirin, the day after surgery. If you are on a blood thinner, discuss with Dr. Fall the timing to re-start it.    Call the office for a postoperative appointment 10-14 days after your  surgery.     Call the office to report any of the following symptoms:  Fever greater than 101.  Nausea and vomiting.  Increasing redness or tenderness, or drainage from your incisions.  Difficulty urinating.    Please call the office at [phone] with any questions or concerns.  You may speak with the nurse (Citlali Stokes RN) or leave a message for Dr. Fall.

## 2022-05-14 NOTE — OR SURGEON
Pre-Procedure patient identification:  I am the primary operating surgeon/proceduralist and I have identified the patient on 05/14/22 at 3:55 PM Bird Fall MD  Phone Number: [phone]

## 2022-05-14 NOTE — OP NOTE
APPENDECTOMY LAPAROSCOPIC Procedure Note    Procedure date: 5/14/2022    Procedure:    APPENDECTOMY LAPAROSCOPIC  CPT(R) Code:  26929 - [place] LAP,APPENDECTOMY      Pre-op Diagnosis     * Acute appendicitis, unspecified acute appendicitis type [K35.80]       Post-op Diagnosis     * Acute appendicitis, unspecified acute appendicitis type [K35.80]    Surgeon(s) and Role:     * Bird Fall MD - Primary     * Al Parmar DO - Resident - Assisting    Anesthesia  General    Staff   Circulator: Arcelia Bee RN  Scrub Person: Mike Diaz    Findings   Inflamed appendix without perforation, gangrene, or free fluid.    Procedure Details   A small transverse incision was made at the top of the umbilicus.  The abdomen was entered with a 10 mm Torre trocar and insufflated to 15 mmHg.  Two left lower quadrant 5 mm ports were placed.  The base of the appendix was divided with a linear stapler.  The mesoappendix was divided with LigaSure.  The appendix was placed in a bag and extracted through the umbilical incision.  The abdomen was irrigated, suctioned, and inspected.  Hemostasis was excellent and the staple line was intact.  The right lower quadrant was suctioned and irrigated.  The 5 mm ports were removed under laparoscopic vision.  The umbilical fascia was closed with a figure-of-eight 0 Vicryl suture.  Skin was closed with 4-0 Monocryl subcuticular suture.    Estimated Blood Loss  10 cc.    Specimens                Order Name Source Comment Collection Info Order Time   PATHOLOGY TISSUE EXAM Appendix Pre-op diagnosis:  Acute appendicitis, unspecified acute appendicitis type [K35.80] Collected By: Bird Fall MD 5/14/2022  5:06 PM     Release to patient   Immediate              Drains   * No LDAs found *    Implants   * No implants in log *           Complications  None; patient tolerated the procedure well.           Disposition   PACU - hemodynamically stable.    Bird Fall MD  Phone  Number: [phone]

## 2022-05-14 NOTE — ANESTHESIA PREPROCEDURE EVALUATION
Relevant Problems   No relevant active problems       Anesthesia ROS/MED HX    Anesthesia History - neg  Pulmonary - neg  Neuro/Psych - neg  Cardiovascular- neg  Hematological - neg  GI/Hepatic- neg  Musculoskeletal- neg  Renal Disease- neg  Endo/Other- neg       Past Surgical History:   Procedure Laterality Date   • NO PAST SURGERIES       Patient Active Problem List   Diagnosis   • Encounter for annual physical exam   • Generalized abdominal pain   • Nausea   • Dysuria   • Urinary frequency   • Acute appendicitis, unspecified acute appendicitis type       Current Facility-Administered Medications   Medication Dose Route Frequency   • ceFAZolin  2 g intravenous q8h INT   • glucose  16-32 g of dextrose oral PRN    Or   • dextrose  15-30 g of dextrose oral PRN    Or   • glucagon  1 mg intramuscular PRN    Or   • dextrose in water  25 mL intravenous PRN   • dextrose 5 % and lactated Ringer's   intravenous Continuous   • [Provider Managed Hold] enoxaparin  40 mg subcutaneous Daily (6a)   • HYDROmorphone  0.5 mg intravenous q3h PRN   • magnesium sulfate  4 g intravenous Once   • metroNIDAZOLE  500 mg intravenous q8h INT   • pantoprazole  40 mg intravenous q24h   • potassium chloride  40 mEq oral Once       Prior to Admission medications    Not on File       CBC Results       05/14/22 05/13/22 0416 2228    WBC 9.24 11.12    RBC 4.78 5.02    HGB 13.8 14.5    HCT 40.8 43.4    MCV 85.4 86.5    MCH 28.9 28.9    MCHC 33.8 33.4     240          BMP Results       05/14/22 05/13/22 0416 2228     138    K 3.3 3.7    Cl 104 103    CO2 26 25    Glucose 113 106    BUN 9 10    Creatinine 1.0 1.0    Calcium 9.3 9.2    Anion Gap 9 10    EGFR >60.0 >60.0         Comment for K at 0416 on 05/14/22: Results obtained on plasma. Plasma Potassium values may be up to 0.4 mEQ/L less than serum values. The differences may be greater for patients with high platelet or white cell counts.    Comment for K at 2228 on 05/13/22:  Results obtained on plasma. Plasma Potassium values may be up to 0.4 mEQ/L less than serum values. The differences may be greater for patients with high platelet or white cell counts.  SLIGHT HEMOLYSIS, RESULT MAY BE INCREASED.              Results from last 7 days   Lab Units 05/14/22  0259   INR  1.2   PTT sec 29       CT ABDOMEN PELVIS WITH IV CONTRAST   ED Interpretation   Patient Name: Al Luu  Exam(s): a/p standard CT  MR#: [id number]    History: RLQ abdominal pain    Preliminary Impression:    Positive for acute appendicitis. The appendix is abnormally thickened, 1.4 cm, image 86, with mild adjacent stranding.    Grossly clear bases. No hydronephrosis. Relatively early renal delays. Distended urinary bladder.Significant amount of gas and stool within the colon, can reflect a component of constipation and ileus.The visualized solid abdominal organs otherwise show no acute pathology.  No bowel obstruction or free air.      Interpreted by: Lester Ron MD, May 14, 2022 02:40 AM    The report of above was discussed by telephone with Dr. Avendano on May 14, 2022 at 02:29 AM and these results were critically read back.    Al Luu [id number], May 14, 2022        Final Result   IMPRESSION:   1.  Acute appendicitis of the retrocecal appendix.          Physical Exam    Airway   Mallampati: II   TM distance: >3 FB   Neck ROM: full  Cardiovascular - normal   Rhythm: regular   Rate: normalPulmonary - normal   clear to auscultation  Dental - normal        Anesthesia Plan    Plan: general    Technique: general endotracheal     Lines and Monitors: PIV and BIS     Airway: video laryngoscope and oral intubation   ASA 1  Anesthetic plan and risks discussed with: patient  Induction:    intravenous   Postop Plan:   Patient Disposition: inpatient floor planned admission   Pain Management: IV analgesics

## 2022-05-14 NOTE — PLAN OF CARE
Plan of Care Review  Plan of Care Reviewed With: patient  Progress: improving  Outcome Summary: Pt admitted with RLQ abd pain and nausea.  Pt states improvement with medications.  Awaiting OR in am.  Ambulating independently in room.  Call bell with in reach

## 2022-05-14 NOTE — ANESTHESIA POSTPROCEDURE EVALUATION
Patient: Al Luu    Procedure Summary     Date: 05/14/22 Room / Location: [facility] OR 6 / [facility] OR    Anesthesia Start: 1627 Anesthesia Stop: 1732    Procedure: APPENDECTOMY LAPAROSCOPIC (N/A ) Diagnosis:       Acute appendicitis, unspecified acute appendicitis type      (Acute appendicitis, unspecified acute appendicitis type [K35.80])    Surgeons: Bird Fall MD Responsible Provider: Parveen Roberson MD    Anesthesia Type: general ASA Status: 1          Anesthesia Type: general  PACU Vitals  5/14/2022 1726 - 5/14/2022 1820      5/14/2022  1730 5/14/2022  1745 5/14/2022  1800 5/14/2022  1815    BP: 142/68 127/60 128/57 121/62    Temp: 37.4 °C (99.3 °F) -- -- --    Pulse: 97 86 82 78    Resp: 12 15 15 15    SpO2: 98 % 96 % 96 % 95 %            Anesthesia Post Evaluation    Pain management: adequate  Patient location during evaluation: PACU  Patient participation: complete - patient participated  Level of consciousness: awake and alert  Cardiovascular status: acceptable  Airway Patency: adequate  Respiratory status: acceptable  Hydration status: acceptable  Anesthetic complications: no

## 2022-05-14 NOTE — ANESTHESIA PROCEDURE NOTES
Airway  Urgency: elective    Start Time: 5/14/2022 4:40 PM    General Information and Staff    Patient location during procedure: OR  Anesthesiologist: Parveen Roberson MD  Resident/CRNA: Clint Marie CRNA  Performed: resident/CRNA     Indications and Patient Condition  Indications for airway management: anesthesia  Sedation level: deep  Preoxygenated: yes  Patient position: sniffing      Final Airway Details  Final airway type: endotracheal airway      Successful airway: ETT     Successful intubation technique: direct laryngoscopy  Facilitating devices/methods: intubating stylet  Endotracheal tube insertion site: oral  Blade: Chiqui  Blade size: #3  ETT size (mm): 7.0  Cormack-Lehane Classification: grade I - full view of glottis  Placement verified by: chest auscultation and capnometry   Measured from: lips  ETT to lips (cm): 21  Number of attempts at approach: 1  Number of other approaches attempted: 0  Atraumatic airway insertion

## 2022-05-14 NOTE — BRIEF OP NOTE
APPENDECTOMY LAPAROSCOPIC Procedure Note    Procedure:    APPENDECTOMY LAPAROSCOPIC  CPT(R) Code:  36837 - [place] LAP,APPENDECTOMY      Pre-op Diagnosis     * Acute appendicitis, unspecified acute appendicitis type [K35.80]       Post-op Diagnosis     * Acute appendicitis, unspecified acute appendicitis type [K35.80]    Surgeon(s) and Role:     * Bird Fall MD - Primary     * Al Parmar DO - Resident - Assisting    Anesthesia: General    Staff:   Circulator: Arcelia Bee RN  Scrub Person: Mike Diaz    Procedure Details   Laparoscopic Appendectomy    Estimated Blood Loss: No blood loss documented.    Specimens:                Order Name Source Comment Collection Info Order Time   PATHOLOGY TISSUE EXAM Appendix Pre-op diagnosis:  Acute appendicitis, unspecified acute appendicitis type [K35.80] Collected By: Bird Fall MD 5/14/2022  5:06 PM     Release to patient   Immediate              Drains: * No LDAs found *    Implants: * No implants in log *           Complications:  None; patient tolerated the procedure well.           Disposition: PACU - hemodynamically stable.           Condition: stable    Bird Fall MD  Phone Number: [phone]

## 2022-05-14 NOTE — PLAN OF CARE
Problem: Adult Inpatient Plan of Care  Goal: Plan of Care Review  Outcome: Progressing  Flowsheets (Taken 5/14/2022 1215)  Progress: improving  Plan of Care Reviewed With: patient  Outcome Summary: Denies pain. Medication given as charted.  Goal: Patient-Specific Goal (Individualized)  Outcome: Progressing   Plan of Care Review  Plan of Care Reviewed With: patient  Progress: improving  Outcome Summary: Denies pain. Medication given as charted.

## 2022-05-15 VITALS
SYSTOLIC BLOOD PRESSURE: 96 MMHG | RESPIRATION RATE: 18 BRPM | HEIGHT: 67 IN | HEART RATE: 65 BPM | BODY MASS INDEX: 25.79 KG/M2 | DIASTOLIC BLOOD PRESSURE: 54 MMHG | TEMPERATURE: 98.1 F | OXYGEN SATURATION: 98 % | WEIGHT: 164.28 LBS

## 2022-05-15 LAB
ANION GAP SERPL CALC-SCNC: 8 MEQ/L (ref 3–15)
BUN SERPL-MCNC: 8 MG/DL (ref 8–20)
CALCIUM SERPL-MCNC: 9 MG/DL (ref 8.9–10.3)
CHLORIDE SERPL-SCNC: 103 MEQ/L (ref 98–109)
CO2 SERPL-SCNC: 26 MEQ/L (ref 22–32)
CREAT SERPL-MCNC: 0.9 MG/DL (ref 0.8–1.3)
ERYTHROCYTE [DISTWIDTH] IN BLOOD BY AUTOMATED COUNT: 11.9 % (ref 11.6–14.4)
GFR SERPL CREATININE-BSD FRML MDRD: >60 ML/MIN/1.73M*2
GLUCOSE SERPL-MCNC: 138 MG/DL (ref 70–99)
HCT VFR BLDCO AUTO: 39.2 % (ref 40.1–51)
HGB BLD-MCNC: 13.2 G/DL (ref 13.7–17.5)
MAGNESIUM SERPL-MCNC: 2.2 MG/DL (ref 1.8–2.5)
MCH RBC QN AUTO: 28.8 PG (ref 28–33.2)
MCHC RBC AUTO-ENTMCNC: 33.7 G/DL (ref 32.2–36.5)
MCV RBC AUTO: 85.6 FL (ref 83–98)
PDW BLD AUTO: 9.9 FL (ref 9.4–12.4)
PLATELET # BLD AUTO: 202 K/UL (ref 150–350)
POTASSIUM SERPL-SCNC: 4.2 MEQ/L (ref 3.6–5.1)
RBC # BLD AUTO: 4.58 M/UL (ref 4.5–5.8)
SODIUM SERPL-SCNC: 137 MEQ/L (ref 136–144)
WBC # BLD AUTO: 7.05 K/UL (ref 3.8–10.5)

## 2022-05-15 PROCEDURE — 99024 POSTOP FOLLOW-UP VISIT: CPT | Performed by: SURGERY

## 2022-05-15 PROCEDURE — G0378 HOSPITAL OBSERVATION PER HR: HCPCS

## 2022-05-15 PROCEDURE — 36415 COLL VENOUS BLD VENIPUNCTURE: CPT | Performed by: STUDENT IN AN ORGANIZED HEALTH CARE EDUCATION/TRAINING PROGRAM

## 2022-05-15 PROCEDURE — 63700000 HC SELF-ADMINISTRABLE DRUG: Performed by: STUDENT IN AN ORGANIZED HEALTH CARE EDUCATION/TRAINING PROGRAM

## 2022-05-15 PROCEDURE — 80048 BASIC METABOLIC PNL TOTAL CA: CPT | Performed by: STUDENT IN AN ORGANIZED HEALTH CARE EDUCATION/TRAINING PROGRAM

## 2022-05-15 PROCEDURE — 85027 COMPLETE CBC AUTOMATED: CPT | Performed by: STUDENT IN AN ORGANIZED HEALTH CARE EDUCATION/TRAINING PROGRAM

## 2022-05-15 PROCEDURE — 83735 ASSAY OF MAGNESIUM: CPT | Performed by: STUDENT IN AN ORGANIZED HEALTH CARE EDUCATION/TRAINING PROGRAM

## 2022-05-15 RX ORDER — ACETAMINOPHEN 325 MG/1
975 TABLET ORAL EVERY 6 HOURS
Qty: 360 TABLET | Refills: 0 | Status: SHIPPED | OUTPATIENT
Start: 2022-05-15 | End: 2022-06-14

## 2022-05-15 RX ADMIN — ACETAMINOPHEN 975 MG: 325 TABLET ORAL at 00:59

## 2022-05-15 RX ADMIN — OXYCODONE HYDROCHLORIDE 5 MG: 5 TABLET ORAL at 01:11

## 2022-05-15 RX ADMIN — ACETAMINOPHEN 975 MG: 325 TABLET ORAL at 06:11

## 2022-05-15 ASSESSMENT — ENCOUNTER SYMPTOMS
ANOREXIA: 0
NAUSEA: 0
VOMITING: 0
HEMATURIA: 0
ABDOMINAL PAIN: 1
DIARRHEA: 0
CHILLS: 0
FEVER: 0
DYSURIA: 0

## 2022-05-15 NOTE — POST-OP (NON-BILLABLE)
"General Surgery Post-Operative Note    Al Luu is a 31 y.o. male  s/p laparoscopic appendectomy today.     S  Patient tolerated procedure well.  States his pain is well controlled with p.o. pain meds.  Denies any nausea or vomiting.  Tolerating regular diet.  States he is passing flatus, denies bowel movements.  Overall feels much better than prior to the operating room.    O  Vitals:  Blood pressure 120/64, pulse 86, temperature 37.2 °C (99 °F), temperature source Oral, resp. rate 16, height 1.702 m (5' 7\"), weight 74.5 kg (164 lb 4.5 oz), SpO2 95 %.    Physical Exam     General appearance: alert, appears stated age and cooperative  Head: normocephalic, without obvious abnormality, atraumatic  Eyes: negative  Nose: no discharge  Throat: lips, mucosa, and tongue normal; teeth and gums normal  Neck: no JVD. supple, symmetrical, trachea midline  Lungs: no increased WOB  Heart: regular rate and rhythm  Abdomen: soft, approp tender, ND, lap incisions c/d/i  Extremities: extremities warm and well-perfused. no cyanosis, clubbing, or edema   Skin: Skin color, texture, turgor normal. No rashes or lesions  Neurologic: Grossly normal      A/P  31-year-old male s/p 5/14 laparoscopic appendectomy for acute appendicitis, doing well    - Regular diet   - Heplock  - multimodal pain control - Tylenol, oxycodone  - Lovenox  - F/u AM labs  - Plan for d/c 5/15    Alysha Avila MD  Please page #[phone] with questions or concerns.  5/14/2022    "

## 2022-05-15 NOTE — PLAN OF CARE
Problem: Adult Inpatient Plan of Care  Goal: Plan of Care Review  Outcome: Progressing  Flowsheets (Taken 5/14/2022 2137)  Progress: improving  Plan of Care Reviewed With: patient  Outcome Summary: Pt got back from the PACU @1915. Gave pain meds @1930 and now pt states pain is much better. All lap sites are intact. Will continue to monitor pt's pain level.  Goal: Patient-Specific Goal (Individualized)  Outcome: Progressing  Goal: Absence of Hospital-Acquired Illness or Injury  Outcome: Progressing  Goal: Optimal Comfort and Wellbeing  Outcome: Progressing  Goal: Readiness for Transition of Care  Outcome: Progressing     Problem: Bleeding (Appendectomy)  Goal: Absence of Bleeding  Outcome: Progressing     Problem: Bowel Motility Impaired (Appendectomy)  Goal: Effective Bowel Elimination  Outcome: Progressing     Problem: Infection (Appendectomy)  Goal: Absence of Infection Signs and Symptoms  Outcome: Progressing     Problem: Ongoing Anesthesia Effects (Appendectomy)  Goal: Anesthesia/Sedation Recovery  Outcome: Progressing     Problem: Pain (Appendectomy)  Goal: Acceptable Pain Control  Outcome: Progressing     Problem: Postoperative Nausea and Vomiting (Appendectomy)  Goal: Nausea and Vomiting Relief  Outcome: Progressing     Problem: Postoperative Urinary Retention (Appendectomy)  Goal: Effective Urinary Elimination  Outcome: Progressing   Plan of Care Review  Plan of Care Reviewed With: patient  Progress: improving  Outcome Summary: Pt got back from the PACU @1915. Gave pain meds @1930 and now pt states pain is much better. All lap sites are intact. Will continue to monitor pt's pain level.

## 2022-05-15 NOTE — ED PROVIDER NOTES
Emergency Medicine Note  HPI   HISTORY OF PRESENT ILLNESS     31-year-old male no past medical history who presents with abdominal pain.  It started about 24 hours ago.  It was initially in his mid abdomen and is now traveled down to his right lower abdomen.  It is worse with moving.  He denies nausea, vomiting, fever or chills, trouble urinating.      History provided by:  Patient  Abdominal Pain  Pain location:  RLQ  Pain quality: aching    Pain radiates to:  Does not radiate  Pain severity:  Moderate  Onset quality:  Gradual  Timing:  Constant  Progression:  Worsening  Chronicity:  New  Relieved by:  Nothing  Worsened by:  Movement  Ineffective treatments:  None tried  Associated symptoms: no anorexia, no chest pain, no chills, no diarrhea, no dysuria, no fever, no hematuria, no nausea and no vomiting    Risk factors: has not had multiple surgeries          Patient History   PAST HISTORY     Reviewed from Nursing Triage:  Tobacco  Allergies  Meds  Med Hx  Surg Hx  Fam Hx         Past Medical History:   Diagnosis Date   • No known health problems        Past Surgical History:   Procedure Laterality Date   • NO PAST SURGERIES         Family History   Problem Relation Age of Onset   • No Known Problems Biological Mother    • No Known Problems Biological Father    • No Known Problems Biological Sister    • Other Biological Brother         UTIs and stone. 1 of 2       Social History     Tobacco Use   • Smoking status: Never Smoker   • Smokeless tobacco: Never Used   Substance Use Topics   • Alcohol use: Yes     Comment: social   • Drug use: No         Review of Systems   REVIEW OF SYSTEMS     Review of Systems   Constitutional: Negative for chills and fever.   Cardiovascular: Negative for chest pain.   Gastrointestinal: Positive for abdominal pain. Negative for anorexia, diarrhea, nausea and vomiting.   Genitourinary: Negative for dysuria and hematuria.   All other systems reviewed and are negative.         VITALS     ED Vitals    Date/Time Temp Pulse Resp BP SpO2 [facility]   05/14/22 0500 -- 78 18 121/57 98 % DML   05/14/22 0400 -- 82 16 129/67 98 % DML   05/14/22 0300 -- 89 20 127/59 98 % DML   05/14/22 0200 -- 90 19 119/58 97 % DML   05/14/22 0100 -- 90 19 126/63 98 % DML   05/14/22 0000 -- 85 20 135/70 100 % DML   05/13/22 2347 37.2 °C (98.9 °F) 88 20 139/71 100 % DML   05/13/22 2050 36.5 °C (97.7 °F) -- -- -- -- CN   05/13/22 2049 -- 108 16 159/69 99 % CN                       Physical Exam   PHYSICAL EXAM     Physical Exam  Vitals and nursing note reviewed.   Constitutional:       Appearance: He is well-developed.   HENT:      Head: Normocephalic.   Eyes:      Extraocular Movements: Extraocular movements intact.   Cardiovascular:      Rate and Rhythm: Normal rate and regular rhythm.      Heart sounds: Normal heart sounds.   Pulmonary:      Effort: Pulmonary effort is normal.   Abdominal:      General: Abdomen is flat. Bowel sounds are normal.      Palpations: Abdomen is soft.      Tenderness: There is abdominal tenderness in the right lower quadrant. There is no guarding or rebound.   Skin:     General: Skin is warm.      Capillary Refill: Capillary refill takes less than 2 seconds.   Neurological:      General: No focal deficit present.      Mental Status: He is alert and oriented to person, place, and time.   Psychiatric:         Mood and Affect: Mood normal.           PROCEDURES     Procedures     DATA     Results     Procedure Component Value Units Date/Time    SARS-CoV-2 (COVID-19), PCR Nasopharynx [[id number]]  (Normal) Collected: 05/14/22 0300    Specimen: Nasopharyngeal Swab from Nasopharynx Updated: 05/14/22 0405    Narrative:      The following orders were created for panel order SARS-CoV-2 (COVID-19), PCR Nasopharynx.  Procedure                               Abnormality         Status                     ---------                               -----------         ------                     SARS-CoV-2  (COVID-19), P...[[id number]]  Normal              Final result                 Please view results for these tests on the individual orders.    SARS-CoV-2 (COVID-19), PCR Nasopharynx [[id number]]  (Normal) Collected: 05/14/22 0300    Specimen: Nasopharyngeal Swab from Nasopharynx Updated: 05/14/22 0405     SARS-CoV-2 (COVID-19) Negative    Narrative:      Nursing instructions: Obtain nasopharyngeal swab ONLY.  Send swab in viral transport media. If RSV/FLU swab is also ordered, both Covid and RSV/FLU can be performed on single swab.    Type and screen [[id number]] Collected: 05/14/22 0259    Specimen: Blood, Venous Updated: 05/14/22 0359     Specimen Expiration 05/17/2022     Antibody Screen Negative     ABO O     Rh Factor Positive     History Check No type on file    Protime-INR [[id number]]  (Abnormal) Collected: 05/14/22 0259    Specimen: Blood, Venous Updated: 05/14/22 0336     PT 14.9 sec      INR 1.2     Comment: Moderate Intensity Anticoagulation = 2.0 to 3.0, High Intensity = 2.5 to 3.5       APTT [[id number]]  (Normal) Collected: 05/14/22 0259    Specimen: Blood, Venous Updated: 05/14/22 0336     PTT 29 sec     Lipase [[id number]]  (Normal) Collected: 05/13/22 2228    Specimen: Blood, Venous Updated: 05/13/22 2307     Lipase 33 U/L     Comprehensive metabolic panel [[id number]]  (Abnormal) Collected: 05/13/22 2228    Specimen: Blood, Venous Updated: 05/13/22 2307     Sodium 138 mEQ/L      Potassium 3.7 mEQ/L      Comment: Results obtained on plasma. Plasma Potassium values may be up to 0.4 mEQ/L less than serum values. The differences may be greater for patients with high platelet or white cell counts.  SLIGHT HEMOLYSIS, RESULT MAY BE INCREASED.        Chloride 103 mEQ/L      CO2 25 mEQ/L      BUN 10 mg/dL      Creatinine 1.0 mg/dL      Glucose 106 mg/dL      Calcium 9.2 mg/dL      AST (SGOT) 26 IU/L      Comment: SLIGHT HEMOLYSIS, RESULT MAY BE INCREASED.        ALT (SGPT) 26 IU/L      Comment: SLIGHT HEMOLYSIS,  RESULT MAY BE INCREASED.        Alkaline Phosphatase 62 IU/L      Total Protein 6.6 g/dL      Comment: Test performed on plasma which typically contains approximately 0.4 g/dL more protein than serum.        Albumin 4.1 g/dL      Bilirubin, Total 0.6 mg/dL      Comment: SLIGHT HEMOLYSIS, RESULT MAY BE INCREASED.        eGFR >60.0 mL/min/1.73m*2      Anion Gap 10 mEQ/L     CBC and differential [[id number]]  (Abnormal) Collected: 05/13/22 2228    Specimen: Blood, Venous Updated: 05/13/22 2242     WBC 11.12 K/uL      RBC 5.02 M/uL      Hemoglobin 14.5 g/dL      Hematocrit 43.4 %      MCV 86.5 fL      MCH 28.9 pg      MCHC 33.4 g/dL      RDW 11.9 %      Platelets 240 K/uL      MPV 10.1 fL      Differential Type Auto     nRBC 0.0 %      Immature Granulocytes 0.3 %      Neutrophils 79.5 %      Lymphocytes 13.3 %      Monocytes 6.1 %      Eosinophils 0.5 %      Basophils 0.3 %      Immature Granulocytes, Absolute 0.03 K/uL      Neutrophils, Absolute 8.84 K/uL      Lymphocytes, Absolute 1.48 K/uL      Monocytes, Absolute 0.68 K/uL      Eosinophils, Absolute 0.06 K/uL      Basophils, Absolute 0.03 K/uL     UA with reflex culture [[id number]]  (Normal) Collected: 05/13/22 2119    Specimen: Urine, Clean Catch Updated: 05/13/22 2142    Narrative:      The following orders were created for panel order UA with reflex culture.  Procedure                               Abnormality         Status                     ---------                               -----------         ------                     UA Reflex to Culture (Ma...[[id number]]  Normal              Final result                 Please view results for these tests on the individual orders.    UA Reflex to Culture (Macroscopic) [[id number]]  (Normal) Collected: 05/13/22 2119    Specimen: Urine, Clean Catch Updated: 05/13/22 2142     Color, Urine Colorless     Clarity, Urine Clear     Specific Gravity, Urine 1.011     pH, Urine 6.5     Leukocyte Esterase Negative     Comment:  Results can be falsely negative due to high specific gravity, some antibiotics, glucose >3 g/dl, or WBC other than neutrophils.        Nitrite, Urine Negative     Protein, Urine Negative     Glucose, Urine Negative mg/dL      Ketones, Urine Negative mg/dL      Urobilinogen, Urine 0.2 EU/dL      Bilirubin, Urine Negative mg/dL      Blood, Urine Negative     Comment: The sensitivity of the occult blood test is equivalent to approximately 4 intact RBC/HPF.             Imaging Results          CT ABDOMEN PELVIS WITH IV CONTRAST (Final result)  Result time 05/14/22 10:34:41    Final result                 Impression:    IMPRESSION:  1.  Acute appendicitis of the retrocecal appendix.             Narrative:    CLINICAL HISTORY: RLQ abdominal pain (Age >= 14y)  COMPARISON: None  COMMENT:  TECHNIQUE: CT of the abdomen and pelvis was performed after the uneventful  administration of intravenous contrast with the patient in the supine position.  Images reconstructed/reformatted in the axial, coronal and  sagittal planes.  CT DOSE:  One or more dose reduction techniques (e.g. automated exposure  control, adjustment of the mA and/or kV according to patient size, use of  iterative reconstruction technique) utilized for this examination.  ADMINISTERED CONTRAST:  100mL of iodixanoL (VISIpaque) 320 mg iodine/mL solution 100 mL  LOWER CHEST: Within normal limits.  BONES: Within normal limits.  LIVER: Within normal limits.  BILE DUCTS: Normal caliber.  GALLBLADDER: Normal  PANCREAS: Within normal limits.  SPLEEN: Within normal limits.  ADRENALS: Within normal limits.  KIDNEYS/URETERS/BLADDER: Within normal limits.  REPRODUCTIVE ORGANS: No pelvic masses.  BOWEL: Acute appendicitis of the retrocecal appendix with appendiceal wall  thickening and hyperemia and mild periappendiceal inflammatory changes. Normal  bowel caliber.  No ascites or free air, no fluid collection  VESSELS: Within normal limits.  LYMPH NODES: within normal  limits.  ABDOMINAL WALL: Within normal limits.                  ED Interpretation    Patient Name: Al Luu  Exam(s): a/p standard CT  MR#: [id number]    History: RLQ abdominal pain    Preliminary Impression:    Positive for acute appendicitis. The appendix is abnormally thickened, 1.4 cm, image 86, with mild adjacent stranding.    Grossly clear bases. No hydronephrosis. Relatively early renal delays. Distended urinary bladder.Significant amount of gas and stool within the colon, can reflect a component of constipation and ileus.The visualized solid abdominal organs otherwise show no acute pathology.  No bowel obstruction or free air.      Interpreted by: Lester Ron MD, May 14, 2022 02:40 AM    The report of above was discussed by telephone with Dr. Avendano on May 14, 2022 at 02:29 AM and these results were critically read back.    Al Luu [id number], May 14, 2022                                No orders to display       Scoring tools                                 ED Course & MDM   MDM / ED COURSE / CLINICAL IMPRESSIONS / DISPO     MDM    ED Course as of 05/15/22 0313   Sat May 14, 2022   0239 Called by radiology  CT scan acute appendicitis [CASSANDRA]   0244 Discussed with surgery resident will see patient in ED [CASSANDRA]      ED Course User Index  [CASSANDRA] Arcelia Avendano MD         Clinical Impressions as of 05/15/22 0313   Acute appendicitis, unspecified acute appendicitis type     Admit / Observation         Arcelia Avendano MD  05/15/22 0315

## 2022-05-15 NOTE — DISCHARGE SUMMARY
Inpatient Discharge Summary    BRIEF OVERVIEW  Admitting Provider:  H&P Notes 4/15/2022 to 5/15/2022         Date of Service   Author Author Type Status Note Type File Time    05/14/22 0326  Kecia Uriostegui MD Resident Attested H&P 05/14/22 1348          Attending Provider: Bird Fall MD Attending phys phone: [phone]  Primary Care Physician at Discharge: Jess Edgar [phone]    Admission Date: 5/13/2022     Discharge Date: 5/15/2022    Primary Discharge Diagnosis  Acute appendicitis, unspecified acute appendicitis type    Secondary Discharge Diagnosis  Active Hospital Problems    Diagnosis Date Noted   • Acute appendicitis, unspecified acute appendicitis type 05/14/2022      Resolved Hospital Problems   No resolved problems to display.       DETAILS OF HOSPITAL STAY    Operative Procedures Performed  Procedure(s):  APPENDECTOMY LAPAROSCOPIC    Consults:       Consult Orders During Admission:  None     Procedures: laparoscopic appendicitis   Pertinent Test Results: N/A    Imaging  CT ABDOMEN PELVIS WITH IV CONTRAST    Result Date: 5/14/2022  IMPRESSION: 1.  Acute appendicitis of the retrocecal appendix.      Presenting Problem/History of Present Illness  Acute appendicitis, unspecified acute appendicitis type [K35.80]     Exam on Day of Discharge  Patient seen and examined on day of discharge.     Hospital Course  Patient is a healthy 32 yo M that [place] [facility] [place] appendicitis. He underwent a laparoscopic appendectomy 5/14 that was uncomplicated. He was placed on a regular diet which he tolerated without any nausea or vomiting. He was discharged on 5/15 without any complications.      Discharge Orders     Medication List      START taking these medications    acetaminophen 325 mg tablet  Commonly known as: TYLENOL  Take 3 tablets (975 mg total) by mouth every 6 (six) hours.  Dose: 975 mg     oxyCODONE 5 mg immediate release tablet  Commonly known as: ROXICODONE  Take 1  tablet (5 mg total) by mouth every 6 (six) hours as needed for moderate pain for up to 5 days.  Dose: 5 mg          Instructions for after discharge     Call provider for:  difficulty breathing, headache or visual disturbances      Call provider for:  extreme fatigue      Call provider for:  hives      Call provider for:  persistent dizziness or light-headedness      Call provider for:  persistent nausea or vomiting      Call provider for:  redness, tenderness, or signs of infection (pain, swelling, redness, odor or green/yellow discharge around incision site)      Call provider for:  severe uncontrolled pain      Call provider for:  temperature >100.4      Discharge diet      Diet Type / Texture: Regular    Follow-up with surgeon (specify):      Follow up with Dr. Fall in 2 weeks.    Lifting Restrictions (Specify)      Maximum Weigth to Lift: 10 Pounds          Outpatient Follow-Ups  Encounter Information    This patient does not currently have any appointments scheduled.       Referrals:  No orders of the defined types were placed in this encounter.      Active Issues Requiring Follow-up  Issue: outpatient surgical follow up  What is Needed: follow up with Dr. Fall in the outpatient setting       Test Results Pending at Discharge  Unresulted Labs (From admission, onward)             Start     Ordered    05/14/22 1706  Pathology Tissue Exam  RELEASE UPON ORDERING        Comments: Specimen 1: Pre-op diagnosis:  Acute appendicitis, unspecified acute appendicitis type [K35.80]     Question:  Release to patient  Answer:  Immediate    05/14/22 1706    05/14/22 0600  CBC  Daily      Question:  Release to patient  Answer:  Immediate   Start Status     05/15/22 0600 Needs to be Collected Details   05/16/22 0600 Scheduled    05/17/22 0600 Scheduled    05/18/22 0600 Scheduled    05/19/22 0600 Scheduled    05/20/22 0600 Scheduled        05/14/22 0324    05/14/22 0600  Basic metabolic panel  Daily      Question:  Release to  patient  Answer:  Immediate   Start Status     05/15/22 0600 Needs to be Collected Details   05/16/22 0600 Scheduled    05/17/22 0600 Scheduled    05/18/22 0600 Scheduled    05/19/22 0600 Scheduled    05/20/22 0600 Scheduled        05/14/22 0324    05/14/22 0600  Magnesium  Daily      Question:  Release to patient  Answer:  Immediate   Start Status     05/15/22 0600 Needs to be Collected Details   05/16/22 0600 Scheduled    05/17/22 0600 Scheduled    05/18/22 0600 Scheduled    05/19/22 0600 Scheduled    05/20/22 0600 Scheduled        05/14/22 0324                Discharge Disposition  Disposition:    Destination:        Code Status at Discharge: Full Code  Physician Order for Life-Sustaining Treatment Document Status      No documents found

## 2022-05-15 NOTE — PROGRESS NOTES
General Surgery Daily Progress Note    POD 1 s/p laparoscopic appendectomy secondary to acute appendicitis    Subjective   No acute events overnight, pain is well controlled with pain medications.  Patient received 1 tray of regular diet without any nausea or vomiting.  Passing flatus, no bowel movements at this time.  Feeling well for discharge later today    Objective     Vital signs in last 24 hours:  Temp:  [36.7 °C (98 °F)-37.4 °C (99.3 °F)] 36.7 °C (98 °F)  Heart Rate:  [76-97] 76  Resp:  [12-18] 16  BP: (104-142)/(56-68) 104/56      Intake/Output Summary (Last 24 hours) at 5/15/2022 0604  Last data filed at 5/14/2022 1731  Gross per 24 hour   Intake 1000 ml   Output 10 ml   Net 990 ml     Intake/Output this shift:  No intake/output data recorded.    Physical Exam    General appearance: alert, appears stated age and cooperative  Lungs: resting comfortably on RA, no increased work of breathing  Heart: NSR  Abdomen: Surgical incisions clean dry intact, soft, appropriately tender to palpation around port sites, non distended  Extremities: extremities normal, warm and well-perfused; no cyanosis, clubbing, or edema  Pulses: 2+ and symmetric  Skin: Skin color, texture, turgor normal. No rashes or lesions  Neurologic: Grossly normal    VTE Assessment: I have reassessed and the patient's VTE risk and treatment plan is appropriate.    Labs  No new labs.    Imaging  I have reviewed the Imaging from the last 24 hrs.      Assessment/Plan   31-year-old gentleman s/p laparoscopic appendectomy secondary to acute appendicitis    -Regular diet new  -Multimodal pain regimen  -Lovenox  -A.m. labs  -Home: 5/15    Please page [phone] with any questions or concerns     Shorty Laughlin MD  General Surgery PGY-1

## 2022-05-17 LAB
CASE RPRT: NORMAL
CLINICAL INFO: NORMAL
PATH REPORT.FINAL DX SPEC: NORMAL
PATH REPORT.GROSS SPEC: NORMAL

## 2022-05-19 ENCOUNTER — OFFICE VISIT (OUTPATIENT)
Dept: SURGERY | Facility: CLINIC | Age: 32
End: 2022-05-19
Payer: COMMERCIAL

## 2022-05-19 DIAGNOSIS — K35.33 APPENDICITIS, ACUTE, WITH PERITONITIS: Primary | ICD-10-CM

## 2022-05-19 PROCEDURE — 99024 POSTOP FOLLOW-UP VISIT: CPT | Performed by: SURGERY

## 2022-05-19 NOTE — LETTER
May 19, 2022     Jess Edgar MD  [address] [facility] [address]    Patient: Al Luu  YOB: 1990  Date of Visit: 5/19/2022      Dear Dr. Edgar:    Al Luu returned for a postop visit today.  Below are my progress notes.    If you have questions, please do not hesitate to call me.  My mobile phone number is [phone], and my office number is [phone].  Thank you again for the opportunity to care for this patient.      Sincerely,        Bird Fall MD        CC: Bird Mcpherson MD  5/19/2022  3:25 PM  Signed  Status post laparoscopic appendectomy on 4/14/2022.  Pathology confirmed acute diffuse appendicitis and periappendicitis.    On follow-up today, he reports that the surgical pain has mostly resolved.  He is eating well, urinating well, and having normal bowel movements.  On exam, his abdomen is soft and nontender.  The incisions are healing very well.  There is no significant swelling.         Follow-up as needed.  He will return to work on Monday, May 23.

## 2022-05-19 NOTE — PROGRESS NOTES
Status post laparoscopic appendectomy on 4/14/2022.  Pathology confirmed acute diffuse appendicitis and periappendicitis.    On follow-up today, he reports that the surgical pain has mostly resolved.  He is eating well, urinating well, and having normal bowel movements.  On exam, his abdomen is soft and nontender.  The incisions are healing very well.  There is no significant swelling.         Follow-up as needed.  He will return to work on Monday, May 23.

## 2022-06-20 ENCOUNTER — TELEPHONE (OUTPATIENT)
Dept: SURGERY | Facility: CLINIC | Age: 32
End: 2022-06-20
Payer: COMMERCIAL

## 2022-06-20 ENCOUNTER — TELEPHONE (OUTPATIENT)
Dept: SURGERY | Facility: CLINIC | Age: 32
End: 2022-06-20

## 2022-06-20 NOTE — TELEPHONE ENCOUNTER
Pt is experiencing some pain around his belly button and he would like to speak with someone. Please give him a call back.

## 2022-06-20 NOTE — TELEPHONE ENCOUNTER
After speaking with patient and Dr. Fall,  Dr. Fall able to see patient on Thursday 6.23. Patient to call office around 2p today to schedule appointment. If no patient callback, please call patient. Thank you.

## 2022-06-20 NOTE — TELEPHONE ENCOUNTER
I spoke with patient and discussed incisional redness. I informed Dr. Fall. Please contact patient to schedule appointment with Dr. Fall for Tuesday 6.21.22. Thank you.

## 2022-11-22 DIAGNOSIS — N96 RECURRENT PREGNANCY LOSS: Primary | ICD-10-CM

## 2022-12-27 LAB
CELLS ANALYZED: 20
CELLS COUNTED: 20
CELLS KARYOTYPED.TOTAL BLD/T: 2
CLINICAL CYTOGENETICIST SPEC: NORMAL
ISCN BAND LEVEL QL: 550
KARYOTYP BLD/T HIGH RES: NORMAL
KARYOTYP BLD/T: NORMAL
SPECIMEN SOURCE: NORMAL

## 2023-04-27 ENCOUNTER — OFFICE VISIT (OUTPATIENT)
Dept: INTERNAL MEDICINE | Facility: CLINIC | Age: 33
End: 2023-04-27
Payer: COMMERCIAL

## 2023-04-27 VITALS
DIASTOLIC BLOOD PRESSURE: 78 MMHG | BODY MASS INDEX: 25.58 KG/M2 | HEIGHT: 67 IN | HEART RATE: 78 BPM | SYSTOLIC BLOOD PRESSURE: 112 MMHG | OXYGEN SATURATION: 98 % | RESPIRATION RATE: 16 BRPM | WEIGHT: 163 LBS | TEMPERATURE: 98.1 F

## 2023-04-27 DIAGNOSIS — Z00.00 ENCOUNTER FOR ANNUAL PHYSICAL EXAM: Primary | ICD-10-CM

## 2023-04-27 DIAGNOSIS — R10.84 GENERALIZED ABDOMINAL PAIN: ICD-10-CM

## 2023-04-27 PROCEDURE — 99395 PREV VISIT EST AGE 18-39: CPT | Performed by: INTERNAL MEDICINE

## 2023-04-27 PROCEDURE — 3008F BODY MASS INDEX DOCD: CPT | Performed by: INTERNAL MEDICINE

## 2023-04-27 RX ORDER — OMEPRAZOLE 40 MG/1
40 CAPSULE, DELAYED RELEASE ORAL
Qty: 30 CAPSULE | Refills: 3 | Status: SHIPPED | OUTPATIENT
Start: 2023-04-27 | End: 2023-10-24

## 2023-04-27 ASSESSMENT — ENCOUNTER SYMPTOMS
FATIGUE: 0
DYSPHORIC MOOD: 0
BACK PAIN: 0
DIARRHEA: 0
SINUS PRESSURE: 0
UNEXPECTED WEIGHT CHANGE: 0
ARTHRALGIAS: 0
COUGH: 0
FREQUENCY: 0
DIZZINESS: 0
CONSTIPATION: 0
NUMBNESS: 0
DIFFICULTY URINATING: 0
ABDOMINAL PAIN: 0
JOINT SWELLING: 0
SHORTNESS OF BREATH: 0
NECK PAIN: 0
PALPITATIONS: 0
BRUISES/BLEEDS EASILY: 0
HEADACHES: 0
FEVER: 0
WEAKNESS: 0
SLEEP DISTURBANCE: 0
NERVOUS/ANXIOUS: 0
SORE THROAT: 0

## 2023-04-27 NOTE — PROGRESS NOTES
Subjective      Patient ID: Al Luu is a 32 y.o. male.    HPI    Patient presents for a physical.  Has been having intermittent abdominal discomfort associated with diarrhea and loose stools.  Symptoms were occurring about 2-3 times a week over the holidays but over the last month or so has not had recurrent symptoms.  Usually associated with loose stools, diarrhea.  No nausea or vomiting.  Tried cutting out dairy and going gluten-free but did not make a big difference.  Admits to not having the best dietary intake.  Wife is trying to cook more at home.  Up-to-date with COVID vaccines.  Did not get the flu shot this past season.  Not sure of Tdap status.  Daughter is 19-month-old.  Does not remember getting one when his wife was pregnant.  Has not had a skin check in the past.  Last eye exam was a couple years ago.  No other new/acute concerns.    The following have been reviewed and updated as appropriate in this visit:     Allergies  Meds  Problems         Past Medical History:   Diagnosis Date   • No known health problems      Past Surgical History:   Procedure Laterality Date   • APPENDECTOMY  2022     Family History   Problem Relation Age of Onset   • No Known Problems Biological Mother    • No Known Problems Biological Father    • Other Biological Sister         1 sister with kidney stone   • No Known Problems Biological Brother    • No Known Problems Biological Daughter    • Lung cancer Other         tobacco/alcohol use   • Other cancer Other         gallbladder cancer   • Stomach cancer Other      Social History     Socioeconomic History   • Marital status:      Spouse name: None   • Number of children: 1   • Years of education: None   • Highest education level: None   Occupational History   • 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 No weight    Diet- regular    Pets- Cat    Restorationism- Baptism    Seat belt- yes    Smoke alarm- yes    Firearms- None        Grew up in [place].  School in [place], worked in [place] for a couple years and moved back here 4 yrs ago.         Social Determinants of Health     Food Insecurity: No Food Insecurity (5/13/2022)    Hunger Vital Sign    • Worried About Running Out of Food in the Last Year: Never true    • Ran Out of Food in the Last Year: Never true       Review of Systems   Constitutional: Negative for fatigue, fever and unexpected weight change.   HENT: Negative for congestion, ear discharge (Occasional earwax buildup), hearing loss, sinus pressure and sore throat.    Eyes: Negative for visual disturbance.   Respiratory: Negative for cough and shortness of breath.    Cardiovascular: Negative for chest pain, palpitations and leg swelling.   Gastrointestinal: Negative for abdominal pain, constipation and diarrhea.   Endocrine: Negative for cold intolerance and heat intolerance.   Genitourinary: Negative for difficulty urinating and frequency.   Musculoskeletal: Negative for arthralgias, back pain, joint swelling and neck pain.   Skin: Negative for rash.   Allergic/Immunologic: Negative for environmental allergies and food allergies.   Neurological: Negative for dizziness, weakness, numbness and headaches.   Hematological: Does not bruise/bleed easily.   Psychiatric/Behavioral: Negative for dysphoric mood and sleep disturbance. The patient is not nervous/anxious.        No Known Allergies  Current Outpatient Medications   Medication Sig Dispense Refill   • omeprazole (PriLOSEC) 40 mg capsule Take 1 capsule (40 mg total) by mouth daily  "before breakfast. 30 capsule 3     No current facility-administered medications for this visit.       Objective   Vitals:    04/27/23 1503   BP: 112/78   Pulse: 78   Resp: 16   Temp: 36.7 °C (98.1 °F)   SpO2: 98%   Weight: 73.9 kg (163 lb)   Height: 1.702 m (5' 7\")     Body mass index is 25.53 kg/m².    Physical Exam  Vitals reviewed.   Constitutional:       Appearance: Normal appearance. He is well-developed.   HENT:      Head: Normocephalic and atraumatic.      Right Ear: Tympanic membrane, ear canal and external ear normal. There is no impacted cerumen.      Left Ear: Tympanic membrane, ear canal and external ear normal. There is no impacted cerumen (Excess cerumen).      Nose: Nose normal.      Mouth/Throat:      Mouth: Mucous membranes are moist.      Pharynx: Oropharynx is clear. Uvula midline. No posterior oropharyngeal erythema.   Eyes:      General: Lids are normal.      Conjunctiva/sclera: Conjunctivae normal.      Pupils: Pupils are equal, round, and reactive to light.   Neck:      Thyroid: No thyroid mass or thyromegaly.      Vascular: No carotid bruit.      Trachea: Trachea and phonation normal.   Cardiovascular:      Rate and Rhythm: Normal rate and regular rhythm.      Heart sounds: Normal heart sounds, S1 normal and S2 normal.   Pulmonary:      Effort: Pulmonary effort is normal.      Breath sounds: Normal breath sounds. No decreased breath sounds, wheezing, rhonchi or rales.   Abdominal:      General: Bowel sounds are normal.      Palpations: Abdomen is soft.      Tenderness: There is no abdominal tenderness. There is no guarding or rebound.   Musculoskeletal:         General: Normal range of motion.      Cervical back: Normal range of motion and neck supple.   Skin:     General: Skin is warm and dry.      Findings: No rash.   Neurological:      General: No focal deficit present.      Mental Status: He is alert and oriented to person, place, and time.      Cranial Nerves: No cranial nerve deficit. "      Sensory: No sensory deficit.      Gait: Gait normal.   Psychiatric:         Mood and Affect: Mood normal.         Speech: Speech normal.         Behavior: Behavior normal. Behavior is cooperative.         Thought Content: Thought content normal.         Judgment: Judgment normal.         Assessment/Plan   Problem List Items Addressed This Visit        Nervous    Generalized abdominal pain     Intermittent symptoms.  Worse over the holidays but currently without symptoms.  Likely due to acid reflux.  Advised to watch for dietary triggers   Such as red sauce, heavy/greasy food, caffeine, alcohol, stress, weight eating, etc.  Can take PPI as needed for flareups.  Provided prescription            Other    Encounter for annual physical exam - Primary     Doing well.  Advised to check records for Tdap status.  Recommended getting the flu shot in the fall.  Due for labs.  Referred to Derm for skin check.  Continue regular eye exams.         Relevant Orders    CBC and Differential    Comprehensive metabolic panel    Lipid panel    TSH w reflex FT4    Ambulatory referral to Dermatology       Jess Edgar MD    4/27/2023

## 2023-04-27 NOTE — ASSESSMENT & PLAN NOTE
Doing well.  Advised to check records for Tdap status.  Recommended getting the flu shot in the fall.  Due for labs.  Referred to Derm for skin check.  Continue regular eye exams.

## 2023-04-27 NOTE — ASSESSMENT & PLAN NOTE
Intermittent symptoms.  Worse over the holidays but currently without symptoms.  Likely due to acid reflux.  Advised to watch for dietary triggers   Such as red sauce, heavy/greasy food, caffeine, alcohol, stress, weight eating, etc.  Can take PPI as needed for flareups.  Provided prescription

## 2023-05-30 ENCOUNTER — APPOINTMENT (RX ONLY)
Dept: URBAN - METROPOLITAN AREA CLINIC 28 | Facility: CLINIC | Age: 33
Setting detail: DERMATOLOGY
End: 2023-05-30

## 2023-05-30 DIAGNOSIS — Z71.89 OTHER SPECIFIED COUNSELING: ICD-10-CM

## 2023-05-30 DIAGNOSIS — D22 MELANOCYTIC NEVI: ICD-10-CM

## 2023-05-30 DIAGNOSIS — D485 NEOPLASM OF UNCERTAIN BEHAVIOR OF SKIN: ICD-10-CM

## 2023-05-30 DIAGNOSIS — L81.4 OTHER MELANIN HYPERPIGMENTATION: ICD-10-CM

## 2023-05-30 DIAGNOSIS — D18.0 HEMANGIOMA: ICD-10-CM

## 2023-05-30 PROBLEM — D22.61 MELANOCYTIC NEVI OF RIGHT UPPER LIMB, INCLUDING SHOULDER: Status: ACTIVE | Noted: 2023-05-30

## 2023-05-30 PROBLEM — D22.62 MELANOCYTIC NEVI OF LEFT UPPER LIMB, INCLUDING SHOULDER: Status: ACTIVE | Noted: 2023-05-30

## 2023-05-30 PROBLEM — D48.5 NEOPLASM OF UNCERTAIN BEHAVIOR OF SKIN: Status: ACTIVE | Noted: 2023-05-30

## 2023-05-30 PROBLEM — D22.5 MELANOCYTIC NEVI OF TRUNK: Status: ACTIVE | Noted: 2023-05-30

## 2023-05-30 PROBLEM — D22.71 MELANOCYTIC NEVI OF RIGHT LOWER LIMB, INCLUDING HIP: Status: ACTIVE | Noted: 2023-05-30

## 2023-05-30 PROBLEM — D22.39 MELANOCYTIC NEVI OF OTHER PARTS OF FACE: Status: ACTIVE | Noted: 2023-05-30

## 2023-05-30 PROBLEM — D22.72 MELANOCYTIC NEVI OF LEFT LOWER LIMB, INCLUDING HIP: Status: ACTIVE | Noted: 2023-05-30

## 2023-05-30 PROBLEM — D18.01 HEMANGIOMA OF SKIN AND SUBCUTANEOUS TISSUE: Status: ACTIVE | Noted: 2023-05-30

## 2023-05-30 PROCEDURE — 11300 SHAVE SKIN LESION 0.5 CM/<: CPT

## 2023-05-30 PROCEDURE — 99203 OFFICE O/P NEW LOW 30 MIN: CPT | Mod: 25

## 2023-05-30 PROCEDURE — ? SHAVE REMOVAL

## 2023-05-30 PROCEDURE — 11301 SHAVE SKIN LESION 0.6-1.0 CM: CPT

## 2023-05-30 PROCEDURE — ? FULL BODY SKIN EXAM

## 2023-05-30 PROCEDURE — ? SUNSCREEN RECOMMENDATIONS

## 2023-05-30 PROCEDURE — ? COUNSELING

## 2023-05-30 ASSESSMENT — LOCATION DETAILED DESCRIPTION DERM
LOCATION DETAILED: EPIGASTRIC SKIN
LOCATION DETAILED: RIGHT CENTRAL MALAR CHEEK
LOCATION DETAILED: RIGHT INFERIOR MEDIAL MIDBACK
LOCATION DETAILED: RIGHT SUPERIOR MEDIAL UPPER BACK
LOCATION DETAILED: LEFT KNEE
LOCATION DETAILED: STERNUM
LOCATION DETAILED: RIGHT LATERAL UPPER BACK
LOCATION DETAILED: RIGHT INFERIOR UPPER BACK
LOCATION DETAILED: RIGHT ANTERIOR PROXIMAL UPPER ARM
LOCATION DETAILED: RIGHT MEDIAL UPPER BACK
LOCATION DETAILED: LEFT ANTERIOR PROXIMAL THIGH
LOCATION DETAILED: LEFT MID-UPPER BACK
LOCATION DETAILED: RIGHT ANTERIOR DISTAL THIGH
LOCATION DETAILED: LEFT INFERIOR UPPER BACK
LOCATION DETAILED: RIGHT PROXIMAL PRETIBIAL REGION
LOCATION DETAILED: LEFT ANTERIOR DISTAL UPPER ARM
LOCATION DETAILED: LEFT ANTERIOR PROXIMAL UPPER ARM
LOCATION DETAILED: RIGHT ANTERIOR DISTAL UPPER ARM

## 2023-05-30 ASSESSMENT — LOCATION SIMPLE DESCRIPTION DERM
LOCATION SIMPLE: LEFT UPPER BACK
LOCATION SIMPLE: RIGHT CHEEK
LOCATION SIMPLE: RIGHT PRETIBIAL REGION
LOCATION SIMPLE: LEFT UPPER ARM
LOCATION SIMPLE: ABDOMEN
LOCATION SIMPLE: CHEST
LOCATION SIMPLE: LEFT THIGH
LOCATION SIMPLE: RIGHT UPPER BACK
LOCATION SIMPLE: RIGHT THIGH
LOCATION SIMPLE: RIGHT UPPER ARM
LOCATION SIMPLE: RIGHT LOWER BACK
LOCATION SIMPLE: LEFT KNEE

## 2023-05-30 ASSESSMENT — LOCATION ZONE DERM
LOCATION ZONE: TRUNK
LOCATION ZONE: FACE
LOCATION ZONE: ARM
LOCATION ZONE: LEG

## 2023-05-30 NOTE — PROCEDURE: SHAVE REMOVAL
Medical Necessity Information: It is in your best interest to select a reason for this procedure from the list below. All of these items fulfill various CMS LCD requirements except the new and changing color options.
Medical Necessity Clause: This procedure was medically necessary because the lesion that was treated was:
Lab: 6
Lab Facility: 0
Detail Level: Detailed
Was A Bandage Applied: Yes
Size Of Lesion In Cm (Required): 0.5
Depth Of Shave: dermis
Biopsy Method: Dermablade
Anesthesia Type: 1% lidocaine with epinephrine
Hemostasis: Aluminum Chloride and Electrocautery
Wound Care: Petrolatum
Render Path Notes In Note?: No
Consent was obtained from the patient. The risks and benefits to therapy were discussed in detail. Specifically, the risks of infection, scarring, bleeding, prolonged wound healing, incomplete removal, allergy to anesthesia, nerve injury and recurrence were addressed. Prior to the procedure, the treatment site was clearly identified and confirmed by the patient. All components of Universal Protocol/PAUSE Rule completed.
Post-Care Instructions: I reviewed with the patient in detail post-care instructions. Patient is to keep the biopsy site dry overnight, and then apply bacitracin twice daily until healed. Patient may apply hydrogen peroxide soaks to remove any crusting.
Notification Instructions: Patient will be notified of pathology results. However, patient instructed to call the office if not contacted within 2 weeks.
Billing Type: Third-Party Bill
Size Of Lesion In Cm (Required): 0.8

## 2023-11-07 ENCOUNTER — APPOINTMENT (RX ONLY)
Dept: URBAN - METROPOLITAN AREA CLINIC 28 | Facility: CLINIC | Age: 33
Setting detail: DERMATOLOGY
End: 2023-11-07

## 2023-11-07 DIAGNOSIS — D22 MELANOCYTIC NEVI: ICD-10-CM

## 2023-11-07 PROBLEM — D22.5 MELANOCYTIC NEVI OF TRUNK: Status: ACTIVE | Noted: 2023-11-07

## 2023-11-07 PROCEDURE — ? PHOTO-DOCUMENTATION

## 2023-11-07 PROCEDURE — ? COUNSELING

## 2023-11-07 PROCEDURE — 11402 EXC TR-EXT B9+MARG 1.1-2 CM: CPT

## 2023-11-07 PROCEDURE — ? PUNCH EXCISION

## 2023-11-07 ASSESSMENT — LOCATION DETAILED DESCRIPTION DERM: LOCATION DETAILED: RIGHT MEDIAL UPPER BACK

## 2023-11-07 ASSESSMENT — LOCATION ZONE DERM: LOCATION ZONE: TRUNK

## 2023-11-07 ASSESSMENT — LOCATION SIMPLE DESCRIPTION DERM: LOCATION SIMPLE: RIGHT UPPER BACK

## 2023-11-07 NOTE — PROCEDURE: MIPS QUALITY
Quality 226: Preventive Care And Screening: Tobacco Use: Screening And Cessation Intervention: Patient screened for tobacco use and is an ex/non-smoker
Quality 431: Preventive Care And Screening: Unhealthy Alcohol Use - Screening: Patient did not receive brief counseling if identified as an unhealthy alcohol user
Quality 130: Documentation Of Current Medications In The Medical Record: Current Medications Documented
Detail Level: Detailed

## 2023-11-07 NOTE — PROCEDURE: PUNCH EXCISION
Medical Necessity Clause: This procedure was medically necessary because the lesion that was treated was:
Detail Level: Detailed
Size Of Lesion (*Required): 0.8
X Size Of Lesion Width In Cm (Optional): 0
Size Of Margin In Cm: 0.2
Punch Size In Mm: 12
Repair Type: None (Simple)
Intermediate / Complex Repair - Final Wound Length In Cm: 2.2
Complex Requirements: Extensive Undermining Performed?: No
Undermining Type: Entire Wound
Debridement Text: The wound edges were debrided prior to proceeding with the closure to facilitate wound healing.
Helical Rim Text: The closure involved the helical rim.
Vermilion Border Text: The closure involved the vermilion border.
Nostril Rim Text: The closure involved the nostril rim.
Retention Suture Text: Retention sutures were placed to support the closure and prevent dehiscence.
Anesthesia Type: 1% lidocaine with epinephrine
Anesthesia Volume In Cc: 3
Hemostasis: Ligature
Epidermal Sutures: 3-0 Ethilon
Epidermal Closure: vertical mattress and simple interrupted
Wound Care: Petrolatum
Suture Removal: 14 days
Lab: 6
1.5 Mm Punch Excision Text: A 1.5 mm punch biopsy was used to excise the lesion to the level of the subcutaneous fat.  Blunt dissection was used to free the lesion from the surrounding tissues and the lesion was removed.
2 Mm Punch Excision Text: A 2 mm punch biopsy was used to excise the lesion to the level of the subcutaneous fat.  Blunt dissection was used to free the lesion from the surrounding tissues and the lesion was removed.
2.5 Mm Punch Excision Text: A 2.5 mm punch biopsy was used to excise the lesion to the level of the subcutaneous fat.  Blunt dissection was used to free the lesion from the surrounding tissues and the lesion was removed.
3 Mm Punch Excision Text: A 3 mm punch biopsy was used to excise the lesion to the level of the subcutaneous fat.  Blunt dissection was used to free the lesion from the surrounding tissues and the lesion was removed.
3.5 Mm Punch Excision Text: A 3.5 mm punch biopsy was used to excise the lesion to the level of the subcutaneous fat.  Blunt dissection was used to free the lesion from the surrounding tissues and the lesion was removed.
4 Mm Punch Excision Text: A 4 mm punch biopsy was used to excise the lesion to the level of the subcutaneous fat.  Blunt dissection was used to free the lesion from the surrounding tissues and the lesion was removed.
4.5 Mm Punch Excision Text: A 4.5 mm punch biopsy was used to excise the lesion to the level of the subcutaneous fat.  Blunt dissection was used to free the lesion from the surrounding tissues and the lesion was removed.
5 Mm Punch Excision Text: A 5 mm punch biopsy was used to excise the lesion to the level of the subcutaneous fat.  Blunt dissection was used to free the lesion from the surrounding tissues and the lesion was removed.
6 Mm Punch Excision Text: A 6 mm punch biopsy was used to excise the lesion to the level of the subcutaneous fat.  Blunt dissection was used to free the lesion from the surrounding tissues and the lesion was removed.
7 Mm Punch Excision Text: A 7 mm punch biopsy was used to excise the lesion to the level of the subcutaneous fat.  Blunt dissection was used to free the lesion from the surrounding tissues and the lesion was removed.
8 Mm Punch Excision Text: A 8 mm punch biopsy was used to excise the lesion to the level of the subcutaneous fat.  Blunt dissection was used to free the lesion from the surrounding tissues and the lesion was removed.
10 Mm Punch Excision Text: A 10 mm punch biopsy was used to excise the lesion to the level of the subcutaneous fat.  Blunt dissection was used to free the lesion from the surrounding tissues and the lesion was removed.
12 Mm Punch Excision Text: A 12 mm punch biopsy was used to excise the lesion to the level of the subcutaneous fat.  Blunt dissection was used to free the lesion from the surrounding tissues and the lesion was removed.
Consent was obtained from the patient. The risks and benefits to therapy were discussed in detail. Specifically, the risks of infection, scarring, bleeding, prolonged wound healing, incomplete removal, allergy to anesthesia, nerve injury and recurrence were addressed. Prior to the procedure, the treatment site was clearly identified and confirmed by the patient. All components of Universal Protocol/PAUSE Rule completed.
Post-Care Instructions: I reviewed with the patient in detail post-care instructions. Patient is to keep the biopsy site dry overnight, and then apply bacitracin twice daily until healed. Patient may apply hydrogen peroxide soaks to remove any crusting.
Notification Instructions: Patient will be notified of biopsy results. However, patient instructed to call the office if not contacted within 2 weeks.
Billing Type: Third-Party Bill

## 2023-11-20 ENCOUNTER — APPOINTMENT (RX ONLY)
Dept: URBAN - METROPOLITAN AREA CLINIC 28 | Facility: CLINIC | Age: 33
Setting detail: DERMATOLOGY
End: 2023-11-20

## 2023-11-20 DIAGNOSIS — Z48.02 ENCOUNTER FOR REMOVAL OF SUTURES: ICD-10-CM

## 2023-11-20 DIAGNOSIS — D22 MELANOCYTIC NEVI: ICD-10-CM

## 2023-11-20 PROBLEM — D22.5 MELANOCYTIC NEVI OF TRUNK: Status: ACTIVE | Noted: 2023-11-20

## 2023-11-20 PROCEDURE — ? COUNSELING

## 2023-11-20 PROCEDURE — ? PUNCH EXCISION

## 2023-11-20 PROCEDURE — ? ADDITIONAL NOTES

## 2023-11-20 PROCEDURE — ? SUTURE REMOVAL

## 2023-11-20 PROCEDURE — ? PHOTO-DOCUMENTATION

## 2023-11-20 PROCEDURE — 11401 EXC TR-EXT B9+MARG 0.6-1 CM: CPT

## 2023-11-20 ASSESSMENT — LOCATION DETAILED DESCRIPTION DERM
LOCATION DETAILED: RIGHT LATERAL UPPER BACK
LOCATION DETAILED: RIGHT MEDIAL UPPER BACK

## 2023-11-20 ASSESSMENT — LOCATION ZONE DERM: LOCATION ZONE: TRUNK

## 2023-11-20 ASSESSMENT — LOCATION SIMPLE DESCRIPTION DERM: LOCATION SIMPLE: RIGHT UPPER BACK

## 2023-11-20 NOTE — PROCEDURE: ADDITIONAL NOTES
Render Risk Assessment In Note?: yes
Additional Notes: Pt wife will take his stitches out.
Detail Level: Zone

## 2023-11-20 NOTE — PROCEDURE: PUNCH EXCISION
Medical Necessity Clause: This procedure was medically necessary because the lesion that was treated was:
Detail Level: Detailed
Size Of Lesion (*Required): 0.7
X Size Of Lesion Width In Cm (Optional): 0
Size Of Margin In Cm: 0.15
Punch Size In Mm: 10
Repair Type: None (Simple)
Intermediate / Complex Repair - Final Wound Length In Cm: 2.4
Complex Requirements: Extensive Undermining Performed?: No
Undermining Type: Entire Wound
Debridement Text: The wound edges were debrided prior to proceeding with the closure to facilitate wound healing.
Helical Rim Text: The closure involved the helical rim.
Vermilion Border Text: The closure involved the vermilion border.
Nostril Rim Text: The closure involved the nostril rim.
Retention Suture Text: Retention sutures were placed to support the closure and prevent dehiscence.
Anesthesia Type: 1% lidocaine with epinephrine
Anesthesia Volume In Cc: 2
Hemostasis: Ligature
Epidermal Sutures: 3-0 Ethilon
Number Of Epidermal Sutures (Optional): 5
Epidermal Closure: vertical mattress and simple interrupted
Wound Care: Mastisol
Wound Dressings: steri-strips
Lab: 6
1.5 Mm Punch Excision Text: A 1.5 mm punch biopsy was used to excise the lesion to the level of the subcutaneous fat.  Blunt dissection was used to free the lesion from the surrounding tissues and the lesion was removed.
2 Mm Punch Excision Text: A 2 mm punch biopsy was used to excise the lesion to the level of the subcutaneous fat.  Blunt dissection was used to free the lesion from the surrounding tissues and the lesion was removed.
2.5 Mm Punch Excision Text: A 2.5 mm punch biopsy was used to excise the lesion to the level of the subcutaneous fat.  Blunt dissection was used to free the lesion from the surrounding tissues and the lesion was removed.
3 Mm Punch Excision Text: A 3 mm punch biopsy was used to excise the lesion to the level of the subcutaneous fat.  Blunt dissection was used to free the lesion from the surrounding tissues and the lesion was removed.
3.5 Mm Punch Excision Text: A 3.5 mm punch biopsy was used to excise the lesion to the level of the subcutaneous fat.  Blunt dissection was used to free the lesion from the surrounding tissues and the lesion was removed.
4 Mm Punch Excision Text: A 4 mm punch biopsy was used to excise the lesion to the level of the subcutaneous fat.  Blunt dissection was used to free the lesion from the surrounding tissues and the lesion was removed.
4.5 Mm Punch Excision Text: A 4.5 mm punch biopsy was used to excise the lesion to the level of the subcutaneous fat.  Blunt dissection was used to free the lesion from the surrounding tissues and the lesion was removed.
5 Mm Punch Excision Text: A 5 mm punch biopsy was used to excise the lesion to the level of the subcutaneous fat.  Blunt dissection was used to free the lesion from the surrounding tissues and the lesion was removed.
6 Mm Punch Excision Text: A 6 mm punch biopsy was used to excise the lesion to the level of the subcutaneous fat.  Blunt dissection was used to free the lesion from the surrounding tissues and the lesion was removed.
7 Mm Punch Excision Text: A 7 mm punch biopsy was used to excise the lesion to the level of the subcutaneous fat.  Blunt dissection was used to free the lesion from the surrounding tissues and the lesion was removed.
8 Mm Punch Excision Text: A 8 mm punch biopsy was used to excise the lesion to the level of the subcutaneous fat.  Blunt dissection was used to free the lesion from the surrounding tissues and the lesion was removed.
10 Mm Punch Excision Text: A 10 mm punch biopsy was used to excise the lesion to the level of the subcutaneous fat.  Blunt dissection was used to free the lesion from the surrounding tissues and the lesion was removed.
12 Mm Punch Excision Text: A 12 mm punch biopsy was used to excise the lesion to the level of the subcutaneous fat.  Blunt dissection was used to free the lesion from the surrounding tissues and the lesion was removed.
Consent was obtained from the patient. The risks and benefits to therapy were discussed in detail. Specifically, the risks of infection, scarring, bleeding, prolonged wound healing, incomplete removal, allergy to anesthesia, nerve injury and recurrence were addressed. Prior to the procedure, the treatment site was clearly identified and confirmed by the patient. All components of Universal Protocol/PAUSE Rule completed.
Post-Care Instructions: I reviewed with the patient in detail post-care instructions. Patient is to keep the biopsy site dry overnight, and then apply bacitracin twice daily until healed. Patient may apply hydrogen peroxide soaks to remove any crusting.
Notification Instructions: Patient will be notified of biopsy results. However, patient instructed to call the office if not contacted within 2 weeks.
Billing Type: Third-Party Bill

## (undated) DEVICE — CORD LAPAROSCOPIC DISP STERILE

## (undated) DEVICE — GOWN SURG X-LARGE MICROCOOL

## (undated) DEVICE — ADHESIVE SKIN DERMABOND ADVANCED 0.7ML

## (undated) DEVICE — NEEDLE DISP HYPO 25GX1-1/2IN

## (undated) DEVICE — TROCAR 1ST ENTRY 5 X 100MM ADV FIX

## (undated) DEVICE — ***USE 138152*** RETRIEVAL SPECIMEN INZII 10MM

## (undated) DEVICE — COVER BACK TABLE ZONE-REINFORC

## (undated) DEVICE — STRYKEFLOW 2 W/ DISP TIP

## (undated) DEVICE — SUTURE MONOCRYL 4-0 Y426H PS-2 27IN

## (undated) DEVICE — EVACUATOR PLUME-AWAY LAP SMOKE PKG

## (undated) DEVICE — TROCAR BLUNT TIP 12 X 100MM

## (undated) DEVICE — PACK RFID GENERAL LAPAROSCOPY

## (undated) DEVICE — APPLICATOR COTTON TIP 6IN MEDC

## (undated) DEVICE — TIPS SCISSOR MICROLINE LAP

## (undated) DEVICE — PAD POSITIONING XL W/ARM PROTECTORS

## (undated) DEVICE — SANI-SERVE SLUSH DRAPE SUBSTIT

## (undated) DEVICE — ***USE 115912*** RELOAD LINEAR CUTTER 45MM TR45B

## (undated) DEVICE — ***USE 56941*** SUTURE VICRYL 0 J603H UR-6

## (undated) DEVICE — SYRINGE 10CC CONTROL LL

## (undated) DEVICE — GLOVE SURG PROTEXIS PF 7.5

## (undated) DEVICE — TUBING INSUFFLATION PNEUMOSURE HEATED HIGH FLOW

## (undated) DEVICE — GLOVE SZ 7.5 LINER PROTEXIS PI BL

## (undated) DEVICE — MANIFOLD SINGLE PORT NEPTUNE

## (undated) DEVICE — ***USE 70465*** ENDOCUTTER ATS45 NO RELOAD

## (undated) DEVICE — APPLICATOR CHLORAPREP 26ML ORANGE TINT

## (undated) DEVICE — PAD GROUND ELECTROSURGICAL W/CORD

## (undated) DEVICE — TROCAR SLEEVE W/ BALLOON 5MM

## (undated) DEVICE — GAUZE 8X4 16 PLY RFID DOUBLE XRAY

## (undated) DEVICE — Device

## (undated) DEVICE — TUBING SMOKE EVAC PENCIL COATED